# Patient Record
Sex: FEMALE | Race: WHITE | NOT HISPANIC OR LATINO | Employment: FULL TIME | URBAN - METROPOLITAN AREA
[De-identification: names, ages, dates, MRNs, and addresses within clinical notes are randomized per-mention and may not be internally consistent; named-entity substitution may affect disease eponyms.]

---

## 2017-11-21 ENCOUNTER — HOSPITAL ENCOUNTER (OUTPATIENT)
Dept: PREADMISSION TESTING | Facility: HOSPITAL | Age: 52
Discharge: HOME | End: 2017-11-21
Attending: OBSTETRICS & GYNECOLOGY | Admitting: OBSTETRICS & GYNECOLOGY

## 2017-11-21 LAB
ANION GAP SERPL CALC-SCNC: 9.4 MMOL/L (ref 10–20)
BACTERIA SPEC AEROBE CULT: NORMAL
BASOPHILS # BLD AUTO: 0.1 10*3/UL (ref 0–0.2)
BASOPHILS NFR BLD AUTO: 1 % (ref 0–2)
BILIRUB UR QL STRIP: NEGATIVE MG/DL
BUN SERPL-MCNC: 14 MG/DL (ref 8–20)
BUN/CREAT SERPL: 17.5 (ref 5.4–26.2)
CALCIUM SERPL-MCNC: 9.1 MG/DL (ref 8.9–10.3)
CASTS URNS QL MICRO: ABNORMAL /[LPF]
CHLORIDE SERPL-SCNC: 111 MMOL/L (ref 101–111)
COLOR UR: YELLOW
CONV BACTERIA IN URINE MICRO: ABNORMAL
CONV CLARITY OF URINE: ABNORMAL
CONV CO2: 22 MMOL/L (ref 22–32)
CONV HYALINE CASTS IN URINE MICRO: ABNORMAL /[LPF] (ref 0–5)
CONV PROTEIN IN URINE BY AUTOMATED TEST STRIP: NEGATIVE MG/DL
CONV SMALL ROUND CELLS: ABNORMAL /[HPF]
CONV UROBILINOGEN IN URINE BY AUTOMATED TEST STRIP: 0.2 MG/DL
CREAT UR-MCNC: 0.8 MG/DL (ref 0.4–1)
CULTURE INDICATED?: ABNORMAL
DIFFERENTIAL METHOD BLD: (no result)
EOSINOPHIL # BLD AUTO: 0.2 10*3/UL (ref 0–0.3)
EOSINOPHIL # BLD AUTO: 3 % (ref 0–3)
ERYTHROCYTE [DISTWIDTH] IN BLOOD BY AUTOMATED COUNT: 12.5 % (ref 11.5–14.5)
GLUCOSE SERPL-MCNC: 136 MG/DL (ref 65–99)
GLUCOSE UR QL: NEGATIVE MG/DL
HCT VFR BLD AUTO: 37.1 % (ref 35–49)
HGB BLD-MCNC: 12.7 G/DL (ref 12–15)
HGB UR QL STRIP: ABNORMAL
KETONES UR QL STRIP: NEGATIVE MG/DL
LEUKOCYTE ESTERASE UR QL STRIP: NEGATIVE
LYMPHOCYTES # BLD AUTO: 2.2 10*3/UL (ref 0.8–4.8)
LYMPHOCYTES NFR BLD AUTO: 35 % (ref 18–42)
Lab: NORMAL
MCH RBC QN AUTO: 34 PG (ref 26–32)
MCHC RBC AUTO-ENTMCNC: 34.4 G/DL (ref 32–36)
MCV RBC AUTO: 99.1 FL (ref 80–94)
MICRO REPORT STATUS: NORMAL
MONOCYTES # BLD AUTO: 0.5 10*3/UL (ref 0.1–1.3)
MONOCYTES NFR BLD AUTO: 8 % (ref 2–11)
NEUTROPHILS # BLD AUTO: 3.3 10*3/UL (ref 2.3–8.6)
NEUTROPHILS NFR BLD AUTO: 53 % (ref 50–75)
NITRITE UR QL STRIP: NEGATIVE
NRBC BLD AUTO-RTO: 0 /100{WBCS}
NRBC/RBC NFR BLD MANUAL: 0 10*3/UL
PH UR STRIP.AUTO: 6 [PH] (ref 4.5–8)
PLATELET # BLD AUTO: 240 10*3/UL (ref 150–450)
PMV BLD AUTO: 8.5 FL (ref 7.4–10.4)
POTASSIUM SERPL-SCNC: 3.4 MMOL/L (ref 3.6–5.1)
RBC # BLD AUTO: 3.74 10*6/UL (ref 4–5.4)
RBC #/AREA URNS HPF: 1 /[HPF] (ref 0–3)
SODIUM SERPL-SCNC: 139 MMOL/L (ref 136–144)
SP GR UR: 1.02 (ref 1–1.03)
SPECIMEN SOURCE: NORMAL
SPERM URNS QL MICRO: ABNORMAL /[HPF]
SQUAMOUS SPT QL MICRO: 6 /[HPF] (ref 0–5)
UNIDENT CRYS URNS QL MICRO: ABNORMAL /[HPF]
WBC # BLD AUTO: 6.2 10*3/UL (ref 4.5–11.5)
WBC #/AREA URNS HPF: 2 /[HPF] (ref 0–5)
YEAST SPEC QL WET PREP: ABNORMAL /[HPF]

## 2017-11-30 ENCOUNTER — HOSPITAL ENCOUNTER (OUTPATIENT)
Dept: OTHER | Facility: HOSPITAL | Age: 52
Setting detail: SPECIMEN
Discharge: HOME | End: 2017-11-30
Attending: OBSTETRICS & GYNECOLOGY | Admitting: OBSTETRICS & GYNECOLOGY

## 2018-09-21 ENCOUNTER — HOSPITAL ENCOUNTER (OUTPATIENT)
Dept: OTHER | Facility: HOSPITAL | Age: 53
Discharge: HOME | End: 2018-09-21
Admitting: ANESTHESIOLOGY

## 2018-09-21 LAB
ANION GAP SERPL CALC-SCNC: 10.5 MMOL/L (ref 10–20)
BASOPHILS # BLD AUTO: 0.1 10*3/UL (ref 0–0.2)
BASOPHILS NFR BLD AUTO: 1 % (ref 0–2)
BILIRUB UR QL STRIP: NEGATIVE MG/DL
BUN SERPL-MCNC: 10 MG/DL (ref 8–20)
BUN/CREAT SERPL: 14.3 (ref 5.4–26.2)
CALCIUM SERPL-MCNC: 9.1 MG/DL (ref 8.9–10.3)
CASTS URNS QL MICRO: ABNORMAL /[LPF]
CHLORIDE SERPL-SCNC: 107 MMOL/L (ref 101–111)
COLOR UR: YELLOW
CONV BACTERIA IN URINE MICRO: NEGATIVE
CONV CLARITY OF URINE: CLEAR
CONV CO2: 24 MMOL/L (ref 22–32)
CONV HYALINE CASTS IN URINE MICRO: 5 /[LPF] (ref 0–5)
CONV PROTEIN IN URINE BY AUTOMATED TEST STRIP: NEGATIVE MG/DL
CONV SMALL ROUND CELLS: ABNORMAL /[HPF]
CONV UROBILINOGEN IN URINE BY AUTOMATED TEST STRIP: 1 MG/DL
CREAT UR-MCNC: 0.7 MG/DL (ref 0.4–1)
CULTURE INDICATED?: ABNORMAL
DIFFERENTIAL METHOD BLD: (no result)
EOSINOPHIL # BLD AUTO: 0.1 10*3/UL (ref 0–0.3)
EOSINOPHIL # BLD AUTO: 2 % (ref 0–3)
ERYTHROCYTE [DISTWIDTH] IN BLOOD BY AUTOMATED COUNT: 12.7 % (ref 11.5–14.5)
GLUCOSE SERPL-MCNC: 86 MG/DL (ref 65–99)
GLUCOSE UR QL: NEGATIVE MG/DL
HCT VFR BLD AUTO: 38.1 % (ref 35–49)
HGB BLD-MCNC: 12.7 G/DL (ref 12–15)
HGB UR QL STRIP: ABNORMAL
KETONES UR QL STRIP: NEGATIVE MG/DL
LEUKOCYTE ESTERASE UR QL STRIP: NEGATIVE
LYMPHOCYTES # BLD AUTO: 2.2 10*3/UL (ref 0.8–4.8)
LYMPHOCYTES NFR BLD AUTO: 36 % (ref 18–42)
MCH RBC QN AUTO: 32.9 PG (ref 26–32)
MCHC RBC AUTO-ENTMCNC: 33.3 G/DL (ref 32–36)
MCV RBC AUTO: 98.7 FL (ref 80–94)
MONOCYTES # BLD AUTO: 0.4 10*3/UL (ref 0.1–1.3)
MONOCYTES NFR BLD AUTO: 7 % (ref 2–11)
NEUTROPHILS # BLD AUTO: 3.2 10*3/UL (ref 2.3–8.6)
NEUTROPHILS NFR BLD AUTO: 54 % (ref 50–75)
NITRITE UR QL STRIP: NEGATIVE
NRBC BLD AUTO-RTO: 0 /100{WBCS}
NRBC/RBC NFR BLD MANUAL: 0 10*3/UL
PH UR STRIP.AUTO: 6.5 [PH] (ref 4.5–8)
PLATELET # BLD AUTO: 240 10*3/UL (ref 150–450)
PMV BLD AUTO: 8.6 FL (ref 7.4–10.4)
POTASSIUM SERPL-SCNC: 3.5 MMOL/L (ref 3.6–5.1)
RBC # BLD AUTO: 3.86 10*6/UL (ref 4–5.4)
RBC #/AREA URNS HPF: 2 /[HPF] (ref 0–3)
SODIUM SERPL-SCNC: 138 MMOL/L (ref 136–144)
SP GR UR: 1.02 (ref 1–1.03)
SPERM URNS QL MICRO: ABNORMAL /[HPF]
SQUAMOUS SPT QL MICRO: 2 /[HPF] (ref 0–5)
UNIDENT CRYS URNS QL MICRO: ABNORMAL /[HPF]
WBC # BLD AUTO: 6 10*3/UL (ref 4.5–11.5)
WBC #/AREA URNS HPF: 1 /[HPF] (ref 0–5)
YEAST SPEC QL WET PREP: ABNORMAL /[HPF]

## 2018-09-28 ENCOUNTER — HOSPITAL ENCOUNTER (OUTPATIENT)
Dept: OTHER | Facility: HOSPITAL | Age: 53
Setting detail: SPECIMEN
Discharge: HOME | End: 2018-09-28
Attending: OBSTETRICS & GYNECOLOGY | Admitting: OBSTETRICS & GYNECOLOGY

## 2019-01-25 ENCOUNTER — OFFICE (AMBULATORY)
Dept: URBAN - METROPOLITAN AREA PATHOLOGY 4 | Facility: PATHOLOGY | Age: 54
End: 2019-01-25
Payer: COMMERCIAL

## 2019-01-25 ENCOUNTER — ON CAMPUS - OUTPATIENT (AMBULATORY)
Dept: URBAN - METROPOLITAN AREA HOSPITAL 2 | Facility: HOSPITAL | Age: 54
End: 2019-01-25
Payer: COMMERCIAL

## 2019-01-25 VITALS
SYSTOLIC BLOOD PRESSURE: 102 MMHG | HEART RATE: 98 BPM | HEART RATE: 79 BPM | HEART RATE: 97 BPM | SYSTOLIC BLOOD PRESSURE: 99 MMHG | SYSTOLIC BLOOD PRESSURE: 97 MMHG | SYSTOLIC BLOOD PRESSURE: 124 MMHG | HEIGHT: 62 IN | HEART RATE: 81 BPM | DIASTOLIC BLOOD PRESSURE: 61 MMHG | HEART RATE: 74 BPM | DIASTOLIC BLOOD PRESSURE: 67 MMHG | RESPIRATION RATE: 16 BRPM | HEART RATE: 73 BPM | DIASTOLIC BLOOD PRESSURE: 64 MMHG | HEART RATE: 85 BPM | HEART RATE: 75 BPM | SYSTOLIC BLOOD PRESSURE: 95 MMHG | OXYGEN SATURATION: 98 % | HEART RATE: 64 BPM | OXYGEN SATURATION: 95 % | HEART RATE: 90 BPM | DIASTOLIC BLOOD PRESSURE: 65 MMHG | DIASTOLIC BLOOD PRESSURE: 93 MMHG | SYSTOLIC BLOOD PRESSURE: 94 MMHG | HEART RATE: 116 BPM | DIASTOLIC BLOOD PRESSURE: 62 MMHG | HEART RATE: 96 BPM | DIASTOLIC BLOOD PRESSURE: 66 MMHG | SYSTOLIC BLOOD PRESSURE: 98 MMHG | RESPIRATION RATE: 15 BRPM | SYSTOLIC BLOOD PRESSURE: 92 MMHG | SYSTOLIC BLOOD PRESSURE: 83 MMHG | SYSTOLIC BLOOD PRESSURE: 93 MMHG | TEMPERATURE: 97.4 F | DIASTOLIC BLOOD PRESSURE: 78 MMHG | OXYGEN SATURATION: 100 % | DIASTOLIC BLOOD PRESSURE: 74 MMHG | RESPIRATION RATE: 18 BRPM | SYSTOLIC BLOOD PRESSURE: 103 MMHG | WEIGHT: 121 LBS | OXYGEN SATURATION: 96 % | DIASTOLIC BLOOD PRESSURE: 59 MMHG | DIASTOLIC BLOOD PRESSURE: 81 MMHG | RESPIRATION RATE: 14 BRPM | OXYGEN SATURATION: 97 % | OXYGEN SATURATION: 99 % | HEART RATE: 66 BPM | SYSTOLIC BLOOD PRESSURE: 116 MMHG

## 2019-01-25 DIAGNOSIS — D12.4 BENIGN NEOPLASM OF DESCENDING COLON: ICD-10-CM

## 2019-01-25 DIAGNOSIS — D12.2 BENIGN NEOPLASM OF ASCENDING COLON: ICD-10-CM

## 2019-01-25 DIAGNOSIS — D12.5 BENIGN NEOPLASM OF SIGMOID COLON: ICD-10-CM

## 2019-01-25 DIAGNOSIS — K62.1 RECTAL POLYP: ICD-10-CM

## 2019-01-25 DIAGNOSIS — D12.3 BENIGN NEOPLASM OF TRANSVERSE COLON: ICD-10-CM

## 2019-01-25 DIAGNOSIS — D12.0 BENIGN NEOPLASM OF CECUM: ICD-10-CM

## 2019-01-25 DIAGNOSIS — Z12.11 ENCOUNTER FOR SCREENING FOR MALIGNANT NEOPLASM OF COLON: ICD-10-CM

## 2019-01-25 LAB
GI HISTOLOGY: A. UNSPECIFIED: (no result)
GI HISTOLOGY: B. UNSPECIFIED: (no result)
GI HISTOLOGY: C. UNSPECIFIED: (no result)
GI HISTOLOGY: D. UNSPECIFIED: (no result)
GI HISTOLOGY: E. UNSPECIFIED: (no result)
GI HISTOLOGY: F. UNSPECIFIED: (no result)
GI HISTOLOGY: PDF REPORT: (no result)

## 2019-01-25 PROCEDURE — 45380 COLONOSCOPY AND BIOPSY: CPT | Mod: 33,59 | Performed by: INTERNAL MEDICINE

## 2019-01-25 PROCEDURE — 88305 TISSUE EXAM BY PATHOLOGIST: CPT | Mod: 33 | Performed by: INTERNAL MEDICINE

## 2019-01-25 PROCEDURE — 45385 COLONOSCOPY W/LESION REMOVAL: CPT | Mod: 33 | Performed by: INTERNAL MEDICINE

## 2020-01-01 ENCOUNTER — APPOINTMENT (OUTPATIENT)
Dept: GENERAL RADIOLOGY | Facility: HOSPITAL | Age: 55
End: 2020-01-01

## 2020-01-01 ENCOUNTER — HOSPITAL ENCOUNTER (INPATIENT)
Facility: HOSPITAL | Age: 55
LOS: 3 days | End: 2020-11-03
Attending: EMERGENCY MEDICINE | Admitting: INTERNAL MEDICINE

## 2020-01-01 ENCOUNTER — APPOINTMENT (OUTPATIENT)
Dept: CT IMAGING | Facility: HOSPITAL | Age: 55
End: 2020-01-01

## 2020-01-01 VITALS
RESPIRATION RATE: 20 BRPM | SYSTOLIC BLOOD PRESSURE: 88 MMHG | HEIGHT: 62 IN | HEART RATE: 129 BPM | OXYGEN SATURATION: 98 % | DIASTOLIC BLOOD PRESSURE: 56 MMHG | BODY MASS INDEX: 24.46 KG/M2 | WEIGHT: 132.94 LBS | TEMPERATURE: 99.7 F

## 2020-01-01 DIAGNOSIS — J84.10 PULMONARY FIBROSIS (HCC): ICD-10-CM

## 2020-01-01 DIAGNOSIS — J93.9 PNEUMOTHORAX ON RIGHT: Primary | ICD-10-CM

## 2020-01-01 DIAGNOSIS — E87.6 HYPOKALEMIA: ICD-10-CM

## 2020-01-01 LAB
ALBUMIN SERPL-MCNC: 3.4 G/DL (ref 3.5–5.2)
ALBUMIN/GLOB SERPL: 1 G/DL
ALP SERPL-CCNC: 101 U/L (ref 39–117)
ALT SERPL W P-5'-P-CCNC: 29 U/L (ref 1–33)
ANA SER QL: NEGATIVE
ANION GAP SERPL CALCULATED.3IONS-SCNC: 12 MMOL/L (ref 5–15)
ANION GAP SERPL CALCULATED.3IONS-SCNC: 13 MMOL/L (ref 5–15)
ANION GAP SERPL CALCULATED.3IONS-SCNC: 18 MMOL/L (ref 5–15)
APTT PPP: 23.6 SECONDS (ref 24–31)
ARTERIAL PATENCY WRIST A: POSITIVE
AST SERPL-CCNC: 73 U/L (ref 1–32)
ATMOSPHERIC PRESS: ABNORMAL MM[HG]
B PARAPERT DNA SPEC QL NAA+PROBE: NOT DETECTED
B PERT DNA SPEC QL NAA+PROBE: NOT DETECTED
BASE EXCESS BLDA CALC-SCNC: -20.8 MMOL/L (ref 0–3)
BASOPHILS # BLD AUTO: 0 10*3/MM3 (ref 0–0.2)
BASOPHILS # BLD AUTO: 0 10*3/MM3 (ref 0–0.2)
BASOPHILS NFR BLD AUTO: 0.3 % (ref 0–1.5)
BASOPHILS NFR BLD AUTO: 0.3 % (ref 0–1.5)
BDY SITE: ABNORMAL
BILIRUB SERPL-MCNC: 0.2 MG/DL (ref 0–1.2)
BUN SERPL-MCNC: 10 MG/DL (ref 6–20)
BUN SERPL-MCNC: 12 MG/DL (ref 6–20)
BUN SERPL-MCNC: 14 MG/DL (ref 6–20)
BUN/CREAT SERPL: 13.9 (ref 7–25)
BUN/CREAT SERPL: 16.4 (ref 7–25)
BUN/CREAT SERPL: 17.2 (ref 7–25)
C PNEUM DNA NPH QL NAA+NON-PROBE: NOT DETECTED
CA-I BLDA-SCNC: 1.13 MMOL/L (ref 1.15–1.33)
CALCIUM SPEC-SCNC: 8.1 MG/DL (ref 8.6–10.5)
CALCIUM SPEC-SCNC: 8.8 MG/DL (ref 8.6–10.5)
CALCIUM SPEC-SCNC: 8.8 MG/DL (ref 8.6–10.5)
CHLORIDE SERPL-SCNC: 100 MMOL/L (ref 98–107)
CHLORIDE SERPL-SCNC: 103 MMOL/L (ref 98–107)
CHLORIDE SERPL-SCNC: 106 MMOL/L (ref 98–107)
CO2 BLDA-SCNC: 16 MMOL/L (ref 22–29)
CO2 SERPL-SCNC: 21 MMOL/L (ref 22–29)
CO2 SERPL-SCNC: 23 MMOL/L (ref 22–29)
CO2 SERPL-SCNC: 24 MMOL/L (ref 22–29)
CREAT SERPL-MCNC: 0.58 MG/DL (ref 0.57–1)
CREAT SERPL-MCNC: 0.73 MG/DL (ref 0.57–1)
CREAT SERPL-MCNC: 1.01 MG/DL (ref 0.57–1)
D-LACTATE SERPL-SCNC: 13.8 MMOL/L (ref 0.5–2)
DEPRECATED RDW RBC AUTO: 39.4 FL (ref 37–54)
DEPRECATED RDW RBC AUTO: 41.1 FL (ref 37–54)
DEPRECATED RDW RBC AUTO: 42 FL (ref 37–54)
EOSINOPHIL # BLD AUTO: 0 10*3/MM3 (ref 0–0.4)
EOSINOPHIL # BLD AUTO: 0 10*3/MM3 (ref 0–0.4)
EOSINOPHIL # BLD MANUAL: 0.68 10*3/MM3 (ref 0–0.4)
EOSINOPHIL NFR BLD AUTO: 0.2 % (ref 0.3–6.2)
EOSINOPHIL NFR BLD AUTO: 0.4 % (ref 0.3–6.2)
EOSINOPHIL NFR BLD MANUAL: 6 % (ref 0.3–6.2)
ERYTHROCYTE [DISTWIDTH] IN BLOOD BY AUTOMATED COUNT: 11.9 % (ref 12.3–15.4)
ERYTHROCYTE [DISTWIDTH] IN BLOOD BY AUTOMATED COUNT: 12.3 % (ref 12.3–15.4)
ERYTHROCYTE [DISTWIDTH] IN BLOOD BY AUTOMATED COUNT: 12.4 % (ref 12.3–15.4)
FLUAV H1 2009 PAND RNA NPH QL NAA+PROBE: NOT DETECTED
FLUAV H1 HA GENE NPH QL NAA+PROBE: NOT DETECTED
FLUAV H3 RNA NPH QL NAA+PROBE: NOT DETECTED
FLUAV SUBTYP SPEC NAA+PROBE: NOT DETECTED
FLUBV RNA ISLT QL NAA+PROBE: NOT DETECTED
GFR SERPL CREATININE-BSD FRML MDRD: 108 ML/MIN/1.73
GFR SERPL CREATININE-BSD FRML MDRD: 57 ML/MIN/1.73
GFR SERPL CREATININE-BSD FRML MDRD: 82 ML/MIN/1.73
GLOBULIN UR ELPH-MCNC: 3.5 GM/DL
GLUCOSE BLDC GLUCOMTR-MCNC: 143 MG/DL (ref 70–105)
GLUCOSE BLDC GLUCOMTR-MCNC: 50 MG/DL (ref 74–100)
GLUCOSE BLDC GLUCOMTR-MCNC: 50 MG/DL (ref 74–100)
GLUCOSE BLDC GLUCOMTR-MCNC: 53 MG/DL (ref 70–105)
GLUCOSE SERPL-MCNC: 121 MG/DL (ref 65–99)
GLUCOSE SERPL-MCNC: 145 MG/DL (ref 65–99)
GLUCOSE SERPL-MCNC: 291 MG/DL (ref 65–99)
HADV DNA SPEC NAA+PROBE: NOT DETECTED
HBA1C MFR BLD: 5.2 % (ref 3.5–5.6)
HCO3 BLDA-SCNC: 13.7 MMOL/L (ref 21–28)
HCOV 229E RNA SPEC QL NAA+PROBE: NOT DETECTED
HCOV HKU1 RNA SPEC QL NAA+PROBE: NOT DETECTED
HCOV NL63 RNA SPEC QL NAA+PROBE: NOT DETECTED
HCOV OC43 RNA SPEC QL NAA+PROBE: NOT DETECTED
HCT VFR BLD AUTO: 31.3 % (ref 34–46.6)
HCT VFR BLD AUTO: 35.6 % (ref 34–46.6)
HCT VFR BLD AUTO: 39.2 % (ref 34–46.6)
HCT VFR BLDA CALC: 39 % (ref 38–51)
HEMODILUTION: NO
HGB BLD-MCNC: 10.2 G/DL (ref 12–15.9)
HGB BLD-MCNC: 12 G/DL (ref 12–15.9)
HGB BLD-MCNC: 12.8 G/DL (ref 12–15.9)
HGB BLDA-MCNC: 13.3 G/DL (ref 12–17)
HMPV RNA NPH QL NAA+NON-PROBE: NOT DETECTED
HOLD SPECIMEN: NORMAL
HPIV1 RNA SPEC QL NAA+PROBE: NOT DETECTED
HPIV2 RNA SPEC QL NAA+PROBE: NOT DETECTED
HPIV3 RNA NPH QL NAA+PROBE: NOT DETECTED
HPIV4 P GENE NPH QL NAA+PROBE: NOT DETECTED
INHALED O2 CONCENTRATION: 100 %
INR PPP: 1.07 (ref 0.93–1.1)
LACTATE HOLD SPECIMEN: NORMAL
LYMPHOCYTES # BLD AUTO: 1.1 10*3/MM3 (ref 0.7–3.1)
LYMPHOCYTES # BLD AUTO: 1.4 10*3/MM3 (ref 0.7–3.1)
LYMPHOCYTES # BLD MANUAL: 3.31 10*3/MM3 (ref 0.7–3.1)
LYMPHOCYTES NFR BLD AUTO: 10.3 % (ref 19.6–45.3)
LYMPHOCYTES NFR BLD AUTO: 7.5 % (ref 19.6–45.3)
LYMPHOCYTES NFR BLD MANUAL: 1 % (ref 5–12)
LYMPHOCYTES NFR BLD MANUAL: 29 % (ref 19.6–45.3)
M PNEUMO IGG SER IA-ACNC: NOT DETECTED
MAGNESIUM SERPL-MCNC: 1.7 MG/DL (ref 1.6–2.6)
MCH RBC QN AUTO: 30.9 PG (ref 26.6–33)
MCH RBC QN AUTO: 31.3 PG (ref 26.6–33)
MCH RBC QN AUTO: 32 PG (ref 26.6–33)
MCHC RBC AUTO-ENTMCNC: 32.4 G/DL (ref 31.5–35.7)
MCHC RBC AUTO-ENTMCNC: 32.5 G/DL (ref 31.5–35.7)
MCHC RBC AUTO-ENTMCNC: 33.5 G/DL (ref 31.5–35.7)
MCV RBC AUTO: 95.4 FL (ref 79–97)
MCV RBC AUTO: 95.4 FL (ref 79–97)
MCV RBC AUTO: 96.1 FL (ref 79–97)
MODALITY: ABNORMAL
MONOCYTES # BLD AUTO: 0.11 10*3/MM3 (ref 0.1–0.9)
MONOCYTES # BLD AUTO: 0.7 10*3/MM3 (ref 0.1–0.9)
MONOCYTES # BLD AUTO: 0.8 10*3/MM3 (ref 0.1–0.9)
MONOCYTES NFR BLD AUTO: 4.8 % (ref 5–12)
MONOCYTES NFR BLD AUTO: 5.4 % (ref 5–12)
NEUTROPHILS # BLD AUTO: 7.3 10*3/MM3 (ref 1.7–7)
NEUTROPHILS NFR BLD AUTO: 11.6 10*3/MM3 (ref 1.7–7)
NEUTROPHILS NFR BLD AUTO: 12.4 10*3/MM3 (ref 1.7–7)
NEUTROPHILS NFR BLD AUTO: 84.2 % (ref 42.7–76)
NEUTROPHILS NFR BLD AUTO: 86.6 % (ref 42.7–76)
NEUTROPHILS NFR BLD MANUAL: 60 % (ref 42.7–76)
NEUTS BAND NFR BLD MANUAL: 4 % (ref 0–5)
NRBC BLD AUTO-RTO: 0 /100 WBC (ref 0–0.2)
NRBC BLD AUTO-RTO: 0 /100 WBC (ref 0–0.2)
PCO2 BLDA: 76.3 MM HG (ref 35–48)
PEEP RESPIRATORY: 5 CM[H2O]
PH BLDA: 6.86 PH UNITS (ref 7.35–7.45)
PLAT MORPH BLD: NORMAL
PLATELET # BLD AUTO: 223 10*3/MM3 (ref 140–450)
PLATELET # BLD AUTO: 280 10*3/MM3 (ref 140–450)
PLATELET # BLD AUTO: 298 10*3/MM3 (ref 140–450)
PMV BLD AUTO: 7 FL (ref 6–12)
PMV BLD AUTO: 7.4 FL (ref 6–12)
PMV BLD AUTO: 8 FL (ref 6–12)
PO2 BLDA: 107.5 MM HG (ref 83–108)
POTASSIUM BLDA-SCNC: 6.2 MMOL/L (ref 3.5–4.5)
POTASSIUM SERPL-SCNC: 3.1 MMOL/L (ref 3.5–5.2)
POTASSIUM SERPL-SCNC: 3.6 MMOL/L (ref 3.5–5.2)
POTASSIUM SERPL-SCNC: 3.8 MMOL/L (ref 3.5–5.2)
PROT SERPL-MCNC: 6.9 G/DL (ref 6–8.5)
PROTHROMBIN TIME: 11.7 SECONDS (ref 9.6–11.7)
QT INTERVAL: 329 MS
RBC # BLD AUTO: 3.29 10*6/MM3 (ref 3.77–5.28)
RBC # BLD AUTO: 3.73 10*6/MM3 (ref 3.77–5.28)
RBC # BLD AUTO: 4.08 10*6/MM3 (ref 3.77–5.28)
RBC MORPH BLD: NORMAL
RESPIRATORY RATE: 16
RHINOVIRUS RNA SPEC NAA+PROBE: NOT DETECTED
RSV RNA NPH QL NAA+NON-PROBE: NOT DETECTED
SAO2 % BLDCOA: 91.1 % (ref 94–98)
SARS-COV-2 RNA NPH QL NAA+NON-PROBE: NOT DETECTED
SCAN SLIDE: NORMAL
SODIUM BLD-SCNC: 139 MMOL/L (ref 138–146)
SODIUM SERPL-SCNC: 138 MMOL/L (ref 136–145)
SODIUM SERPL-SCNC: 139 MMOL/L (ref 136–145)
SODIUM SERPL-SCNC: 143 MMOL/L (ref 136–145)
TOXIC GRANULATION: ABNORMAL
VENTILATOR MODE: ABNORMAL
VT ON VENT VENT: 400 ML
WBC # BLD AUTO: 11.4 10*3/MM3 (ref 3.4–10.8)
WBC # BLD AUTO: 13.7 10*3/MM3 (ref 3.4–10.8)
WBC # BLD AUTO: 14.3 10*3/MM3 (ref 3.4–10.8)

## 2020-01-01 PROCEDURE — 85025 COMPLETE CBC W/AUTO DIFF WBC: CPT | Performed by: EMERGENCY MEDICINE

## 2020-01-01 PROCEDURE — 94799 UNLISTED PULMONARY SVC/PX: CPT

## 2020-01-01 PROCEDURE — 25010000002 ATROPINE PER 0.01 MG: Performed by: NURSE PRACTITIONER

## 2020-01-01 PROCEDURE — 25010000002 VANCOMYCIN 10 G RECONSTITUTED SOLUTION: Performed by: INTERNAL MEDICINE

## 2020-01-01 PROCEDURE — 25010000002 CEFEPIME PER 500 MG: Performed by: INTERNAL MEDICINE

## 2020-01-01 PROCEDURE — 5A1935Z RESPIRATORY VENTILATION, LESS THAN 24 CONSECUTIVE HOURS: ICD-10-PCS | Performed by: INTERNAL MEDICINE

## 2020-01-01 PROCEDURE — 99284 EMERGENCY DEPT VISIT MOD MDM: CPT

## 2020-01-01 PROCEDURE — 80051 ELECTROLYTE PANEL: CPT

## 2020-01-01 PROCEDURE — 25010000002 HYDROMORPHONE PER 4 MG: Performed by: NURSE PRACTITIONER

## 2020-01-01 PROCEDURE — 80053 COMPREHEN METABOLIC PANEL: CPT | Performed by: EMERGENCY MEDICINE

## 2020-01-01 PROCEDURE — 25010000002 ONDANSETRON PER 1 MG: Performed by: STUDENT IN AN ORGANIZED HEALTH CARE EDUCATION/TRAINING PROGRAM

## 2020-01-01 PROCEDURE — 83520 IMMUNOASSAY QUANT NOS NONAB: CPT | Performed by: INTERNAL MEDICINE

## 2020-01-01 PROCEDURE — 36600 WITHDRAWAL OF ARTERIAL BLOOD: CPT

## 2020-01-01 PROCEDURE — 25010000002 EPINEPHRINE 1 MG/10ML SOLUTION PREFILLED SYRINGE: Performed by: NURSE PRACTITIONER

## 2020-01-01 PROCEDURE — 85007 BL SMEAR W/DIFF WBC COUNT: CPT | Performed by: EMERGENCY MEDICINE

## 2020-01-01 PROCEDURE — 25010000002 VANCOMYCIN 1 G RECONSTITUTED SOLUTION 1 EACH VIAL: Performed by: INTERNAL MEDICINE

## 2020-01-01 PROCEDURE — 85610 PROTHROMBIN TIME: CPT | Performed by: EMERGENCY MEDICINE

## 2020-01-01 PROCEDURE — 82962 GLUCOSE BLOOD TEST: CPT

## 2020-01-01 PROCEDURE — 86256 FLUORESCENT ANTIBODY TITER: CPT | Performed by: INTERNAL MEDICINE

## 2020-01-01 PROCEDURE — 5A12012 PERFORMANCE OF CARDIAC OUTPUT, SINGLE, MANUAL: ICD-10-PCS | Performed by: INTERNAL MEDICINE

## 2020-01-01 PROCEDURE — 82803 BLOOD GASES ANY COMBINATION: CPT

## 2020-01-01 PROCEDURE — 86038 ANTINUCLEAR ANTIBODIES: CPT | Performed by: INTERNAL MEDICINE

## 2020-01-01 PROCEDURE — 83735 ASSAY OF MAGNESIUM: CPT | Performed by: STUDENT IN AN ORGANIZED HEALTH CARE EDUCATION/TRAINING PROGRAM

## 2020-01-01 PROCEDURE — 71045 X-RAY EXAM CHEST 1 VIEW: CPT

## 2020-01-01 PROCEDURE — 99232 SBSQ HOSP IP/OBS MODERATE 35: CPT | Performed by: HOSPITALIST

## 2020-01-01 PROCEDURE — 71046 X-RAY EXAM CHEST 2 VIEWS: CPT

## 2020-01-01 PROCEDURE — 85018 HEMOGLOBIN: CPT

## 2020-01-01 PROCEDURE — 87040 BLOOD CULTURE FOR BACTERIA: CPT | Performed by: INTERNAL MEDICINE

## 2020-01-01 PROCEDURE — 0BH17EZ INSERTION OF ENDOTRACHEAL AIRWAY INTO TRACHEA, VIA NATURAL OR ARTIFICIAL OPENING: ICD-10-PCS | Performed by: INTERNAL MEDICINE

## 2020-01-01 PROCEDURE — 80048 BASIC METABOLIC PNL TOTAL CA: CPT | Performed by: STUDENT IN AN ORGANIZED HEALTH CARE EDUCATION/TRAINING PROGRAM

## 2020-01-01 PROCEDURE — 99222 1ST HOSP IP/OBS MODERATE 55: CPT | Performed by: STUDENT IN AN ORGANIZED HEALTH CARE EDUCATION/TRAINING PROGRAM

## 2020-01-01 PROCEDURE — 74018 RADEX ABDOMEN 1 VIEW: CPT

## 2020-01-01 PROCEDURE — 94002 VENT MGMT INPAT INIT DAY: CPT

## 2020-01-01 PROCEDURE — 85025 COMPLETE CBC W/AUTO DIFF WBC: CPT | Performed by: STUDENT IN AN ORGANIZED HEALTH CARE EDUCATION/TRAINING PROGRAM

## 2020-01-01 PROCEDURE — 99223 1ST HOSP IP/OBS HIGH 75: CPT | Performed by: THORACIC SURGERY (CARDIOTHORACIC VASCULAR SURGERY)

## 2020-01-01 PROCEDURE — 71250 CT THORAX DX C-: CPT

## 2020-01-01 PROCEDURE — 0W9930Z DRAINAGE OF RIGHT PLEURAL CAVITY WITH DRAINAGE DEVICE, PERCUTANEOUS APPROACH: ICD-10-PCS | Performed by: EMERGENCY MEDICINE

## 2020-01-01 PROCEDURE — 92950 HEART/LUNG RESUSCITATION CPR: CPT

## 2020-01-01 PROCEDURE — 25010000003 CEFAZOLIN PER 500 MG: Performed by: EMERGENCY MEDICINE

## 2020-01-01 PROCEDURE — 83036 HEMOGLOBIN GLYCOSYLATED A1C: CPT | Performed by: EMERGENCY MEDICINE

## 2020-01-01 PROCEDURE — 25010000003 CEFAZOLIN PER 500 MG: Performed by: STUDENT IN AN ORGANIZED HEALTH CARE EDUCATION/TRAINING PROGRAM

## 2020-01-01 PROCEDURE — 93005 ELECTROCARDIOGRAM TRACING: CPT | Performed by: EMERGENCY MEDICINE

## 2020-01-01 PROCEDURE — 85730 THROMBOPLASTIN TIME PARTIAL: CPT | Performed by: EMERGENCY MEDICINE

## 2020-01-01 PROCEDURE — 82330 ASSAY OF CALCIUM: CPT

## 2020-01-01 PROCEDURE — 25010000002 HYDROMORPHONE PER 4 MG

## 2020-01-01 PROCEDURE — 83605 ASSAY OF LACTIC ACID: CPT

## 2020-01-01 PROCEDURE — 0202U NFCT DS 22 TRGT SARS-COV-2: CPT | Performed by: INTERNAL MEDICINE

## 2020-01-01 RX ORDER — TAMOXIFEN CITRATE 10 MG/1
20 TABLET ORAL DAILY
Status: DISCONTINUED | OUTPATIENT
Start: 2020-01-01 | End: 2020-01-01 | Stop reason: HOSPADM

## 2020-01-01 RX ORDER — POTASSIUM CHLORIDE 1.5 G/1.77G
40 POWDER, FOR SOLUTION ORAL AS NEEDED
Status: DISCONTINUED | OUTPATIENT
Start: 2020-01-01 | End: 2020-01-01 | Stop reason: HOSPADM

## 2020-01-01 RX ORDER — BUPIVACAINE HCL/0.9 % NACL/PF 0.25 %
.02-.3 PLASTIC BAG, INJECTION (ML) EPIDURAL CONTINUOUS PRN
Status: DISCONTINUED | OUTPATIENT
Start: 2020-01-01 | End: 2020-01-01 | Stop reason: HOSPADM

## 2020-01-01 RX ORDER — OXCARBAZEPINE 300 MG/1
900 TABLET, FILM COATED ORAL 2 TIMES DAILY
COMMUNITY
End: 2020-01-01

## 2020-01-01 RX ORDER — SODIUM CHLORIDE 0.9 % (FLUSH) 0.9 %
10 SYRINGE (ML) INJECTION EVERY 12 HOURS SCHEDULED
Status: DISCONTINUED | OUTPATIENT
Start: 2020-01-01 | End: 2020-01-01 | Stop reason: HOSPADM

## 2020-01-01 RX ORDER — ACETAMINOPHEN 160 MG/5ML
650 SOLUTION ORAL EVERY 4 HOURS PRN
Status: DISCONTINUED | OUTPATIENT
Start: 2020-01-01 | End: 2020-01-01 | Stop reason: SDUPTHER

## 2020-01-01 RX ORDER — HYDROMORPHONE HCL 110MG/55ML
0.5 PATIENT CONTROLLED ANALGESIA SYRINGE INTRAVENOUS
Status: DISCONTINUED | OUTPATIENT
Start: 2020-01-01 | End: 2020-01-01 | Stop reason: HOSPADM

## 2020-01-01 RX ORDER — FENTANYL CITRATE-0.9 % NACL/PF 10 MCG/ML
50-300 PLASTIC BAG, INJECTION (ML) INTRAVENOUS CONTINUOUS PRN
Status: DISCONTINUED | OUTPATIENT
Start: 2020-01-01 | End: 2020-01-01 | Stop reason: HOSPADM

## 2020-01-01 RX ORDER — BISACODYL 10 MG
10 SUPPOSITORY, RECTAL RECTAL DAILY PRN
Status: DISCONTINUED | OUTPATIENT
Start: 2020-01-01 | End: 2020-01-01 | Stop reason: HOSPADM

## 2020-01-01 RX ORDER — DOCUSATE SODIUM 100 MG/1
100 CAPSULE, LIQUID FILLED ORAL 2 TIMES DAILY PRN
Status: DISCONTINUED | OUTPATIENT
Start: 2020-01-01 | End: 2020-01-01 | Stop reason: HOSPADM

## 2020-01-01 RX ORDER — ACETAMINOPHEN 325 MG/1
650 TABLET ORAL EVERY 4 HOURS PRN
Status: DISCONTINUED | OUTPATIENT
Start: 2020-01-01 | End: 2020-01-01 | Stop reason: HOSPADM

## 2020-01-01 RX ORDER — TAMOXIFEN CITRATE 20 MG/1
20 TABLET ORAL DAILY
COMMUNITY

## 2020-01-01 RX ORDER — PANTOPRAZOLE SODIUM 40 MG/10ML
40 INJECTION, POWDER, LYOPHILIZED, FOR SOLUTION INTRAVENOUS
Status: DISCONTINUED | OUTPATIENT
Start: 2020-01-01 | End: 2020-01-01 | Stop reason: HOSPADM

## 2020-01-01 RX ORDER — MAGNESIUM SULFATE 1 G/100ML
1 INJECTION INTRAVENOUS AS NEEDED
Status: DISCONTINUED | OUTPATIENT
Start: 2020-01-01 | End: 2020-01-01 | Stop reason: HOSPADM

## 2020-01-01 RX ORDER — ONDANSETRON 4 MG/1
4 TABLET, FILM COATED ORAL EVERY 6 HOURS PRN
Status: DISCONTINUED | OUTPATIENT
Start: 2020-01-01 | End: 2020-01-01 | Stop reason: HOSPADM

## 2020-01-01 RX ORDER — SODIUM CHLORIDE 0.9 % (FLUSH) 0.9 %
10 SYRINGE (ML) INJECTION AS NEEDED
Status: DISCONTINUED | OUTPATIENT
Start: 2020-01-01 | End: 2020-01-01 | Stop reason: HOSPADM

## 2020-01-01 RX ORDER — NITROGLYCERIN 0.4 MG/1
0.4 TABLET SUBLINGUAL
Status: DISCONTINUED | OUTPATIENT
Start: 2020-01-01 | End: 2020-01-01 | Stop reason: HOSPADM

## 2020-01-01 RX ORDER — EPINEPHRINE 0.1 MG/ML
SYRINGE (ML) INJECTION
Status: COMPLETED | OUTPATIENT
Start: 2020-01-01 | End: 2020-01-01

## 2020-01-01 RX ORDER — SODIUM CHLORIDE, SODIUM LACTATE, POTASSIUM CHLORIDE, CALCIUM CHLORIDE 600; 310; 30; 20 MG/100ML; MG/100ML; MG/100ML; MG/100ML
150 INJECTION, SOLUTION INTRAVENOUS CONTINUOUS
Status: DISPENSED | OUTPATIENT
Start: 2020-01-01 | End: 2020-01-01

## 2020-01-01 RX ORDER — POTASSIUM CHLORIDE 29.8 MG/ML
20 INJECTION INTRAVENOUS
Status: DISCONTINUED | OUTPATIENT
Start: 2020-01-01 | End: 2020-01-01 | Stop reason: HOSPADM

## 2020-01-01 RX ORDER — ACETAMINOPHEN 650 MG/1
650 SUPPOSITORY RECTAL EVERY 4 HOURS PRN
Status: DISCONTINUED | OUTPATIENT
Start: 2020-01-01 | End: 2020-01-01 | Stop reason: SDUPTHER

## 2020-01-01 RX ORDER — TOPIRAMATE 25 MG/1
75 TABLET ORAL EVERY 12 HOURS SCHEDULED
Status: DISCONTINUED | OUTPATIENT
Start: 2020-01-01 | End: 2020-01-01 | Stop reason: HOSPADM

## 2020-01-01 RX ORDER — HYDROMORPHONE HCL 110MG/55ML
1 PATIENT CONTROLLED ANALGESIA SYRINGE INTRAVENOUS ONCE
Status: COMPLETED | OUTPATIENT
Start: 2020-01-01 | End: 2020-01-01

## 2020-01-01 RX ORDER — TOPIRAMATE 50 MG/1
75 TABLET, FILM COATED ORAL 2 TIMES DAILY
COMMUNITY

## 2020-01-01 RX ORDER — MAGNESIUM SULFATE HEPTAHYDRATE 40 MG/ML
2 INJECTION, SOLUTION INTRAVENOUS AS NEEDED
Status: DISCONTINUED | OUTPATIENT
Start: 2020-01-01 | End: 2020-01-01 | Stop reason: HOSPADM

## 2020-01-01 RX ORDER — POTASSIUM CHLORIDE 20 MEQ/1
20 TABLET, EXTENDED RELEASE ORAL ONCE
Status: DISCONTINUED | OUTPATIENT
Start: 2020-01-01 | End: 2020-01-01 | Stop reason: HOSPADM

## 2020-01-01 RX ORDER — POTASSIUM CHLORIDE 7.45 MG/ML
10 INJECTION INTRAVENOUS
Status: DISCONTINUED | OUTPATIENT
Start: 2020-01-01 | End: 2020-01-01 | Stop reason: HOSPADM

## 2020-01-01 RX ORDER — ACETAMINOPHEN 650 MG/1
650 SUPPOSITORY RECTAL EVERY 4 HOURS PRN
Status: DISCONTINUED | OUTPATIENT
Start: 2020-01-01 | End: 2020-01-01 | Stop reason: HOSPADM

## 2020-01-01 RX ORDER — OXCARBAZEPINE 600 MG/1
900 TABLET, FILM COATED ORAL 2 TIMES DAILY
COMMUNITY

## 2020-01-01 RX ORDER — ATROPINE SULFATE 1 MG/ML
INJECTION, SOLUTION INTRAMUSCULAR; INTRAVENOUS; SUBCUTANEOUS
Status: COMPLETED | OUTPATIENT
Start: 2020-01-01 | End: 2020-01-01

## 2020-01-01 RX ORDER — ACETAMINOPHEN 325 MG/1
650 TABLET ORAL EVERY 4 HOURS PRN
Status: DISCONTINUED | OUTPATIENT
Start: 2020-01-01 | End: 2020-01-01 | Stop reason: SDUPTHER

## 2020-01-01 RX ORDER — MORPHINE SULFATE 4 MG/ML
4 INJECTION, SOLUTION INTRAMUSCULAR; INTRAVENOUS
Status: DISCONTINUED | OUTPATIENT
Start: 2020-01-01 | End: 2020-01-01 | Stop reason: HOSPADM

## 2020-01-01 RX ORDER — SODIUM CHLORIDE 9 MG/ML
100 INJECTION, SOLUTION INTRAVENOUS CONTINUOUS
Status: DISCONTINUED | OUTPATIENT
Start: 2020-01-01 | End: 2020-01-01

## 2020-01-01 RX ORDER — LORAZEPAM 2 MG/ML
1 INJECTION INTRAMUSCULAR
Status: DISCONTINUED | OUTPATIENT
Start: 2020-01-01 | End: 2020-01-01 | Stop reason: HOSPADM

## 2020-01-01 RX ORDER — CARBOXYMETHYLCELLULOSE SODIUM 10 MG/ML
1 GEL OPHTHALMIC
Status: DISCONTINUED | OUTPATIENT
Start: 2020-01-01 | End: 2020-01-01 | Stop reason: HOSPADM

## 2020-01-01 RX ORDER — DEXTROSE MONOHYDRATE 25 G/50ML
INJECTION, SOLUTION INTRAVENOUS
Status: COMPLETED
Start: 2020-01-01 | End: 2020-01-01

## 2020-01-01 RX ORDER — VECURONIUM BROMIDE FOR INJECTION 1 MG/ML
0.1 INJECTION, POWDER, LYOPHILIZED, FOR SOLUTION INTRAVENOUS
Status: DISCONTINUED | OUTPATIENT
Start: 2020-01-01 | End: 2020-01-01

## 2020-01-01 RX ORDER — ACETAMINOPHEN 160 MG/5ML
650 SOLUTION ORAL EVERY 4 HOURS PRN
Status: DISCONTINUED | OUTPATIENT
Start: 2020-01-01 | End: 2020-01-01 | Stop reason: HOSPADM

## 2020-01-01 RX ORDER — POTASSIUM CHLORIDE 20 MEQ/1
40 TABLET, EXTENDED RELEASE ORAL AS NEEDED
Status: DISCONTINUED | OUTPATIENT
Start: 2020-01-01 | End: 2020-01-01 | Stop reason: HOSPADM

## 2020-01-01 RX ORDER — HYDROMORPHONE HCL 110MG/55ML
PATIENT CONTROLLED ANALGESIA SYRINGE INTRAVENOUS
Status: COMPLETED
Start: 2020-01-01 | End: 2020-01-01

## 2020-01-01 RX ORDER — CHOLECALCIFEROL (VITAMIN D3) 125 MCG
5 CAPSULE ORAL NIGHTLY PRN
Status: DISCONTINUED | OUTPATIENT
Start: 2020-01-01 | End: 2020-01-01 | Stop reason: HOSPADM

## 2020-01-01 RX ORDER — ONDANSETRON 2 MG/ML
4 INJECTION INTRAMUSCULAR; INTRAVENOUS EVERY 6 HOURS PRN
Status: DISCONTINUED | OUTPATIENT
Start: 2020-01-01 | End: 2020-01-01 | Stop reason: HOSPADM

## 2020-01-01 RX ADMIN — HYDROMORPHONE HYDROCHLORIDE 0.5 MG: 2 INJECTION, SOLUTION INTRAMUSCULAR; INTRAVENOUS; SUBCUTANEOUS at 20:35

## 2020-01-01 RX ADMIN — CEFAZOLIN 1 G: 1 INJECTION, POWDER, FOR SOLUTION INTRAMUSCULAR; INTRAVENOUS at 23:04

## 2020-01-01 RX ADMIN — HYDROMORPHONE HYDROCHLORIDE 0.5 MG: 2 INJECTION, SOLUTION INTRAMUSCULAR; INTRAVENOUS; SUBCUTANEOUS at 01:44

## 2020-01-01 RX ADMIN — CEFEPIME HYDROCHLORIDE 2 G: 2 INJECTION, POWDER, FOR SOLUTION INTRAVENOUS at 20:34

## 2020-01-01 RX ADMIN — Medication 1 MG: at 07:17

## 2020-01-01 RX ADMIN — TAMOXIFEN CITRATE 20 MG: 10 TABLET, FILM COATED ORAL at 08:34

## 2020-01-01 RX ADMIN — CEFEPIME HYDROCHLORIDE 2 G: 2 INJECTION, POWDER, FOR SOLUTION INTRAVENOUS at 03:10

## 2020-01-01 RX ADMIN — ONDANSETRON 4 MG: 2 INJECTION INTRAMUSCULAR; INTRAVENOUS at 14:10

## 2020-01-01 RX ADMIN — OXCARBAZEPINE 900 MG: 600 TABLET, FILM COATED ORAL at 20:57

## 2020-01-01 RX ADMIN — ACETAMINOPHEN 650 MG: 325 TABLET, FILM COATED ORAL at 20:56

## 2020-01-01 RX ADMIN — Medication 1 MG: at 06:20

## 2020-01-01 RX ADMIN — Medication 10 ML: at 09:17

## 2020-01-01 RX ADMIN — HYDROMORPHONE HYDROCHLORIDE 0.5 MG: 2 INJECTION, SOLUTION INTRAMUSCULAR; INTRAVENOUS; SUBCUTANEOUS at 12:39

## 2020-01-01 RX ADMIN — HYDROMORPHONE HYDROCHLORIDE 1 MG: 2 INJECTION, SOLUTION INTRAMUSCULAR; INTRAVENOUS; SUBCUTANEOUS at 06:20

## 2020-01-01 RX ADMIN — Medication 10 ML: at 20:52

## 2020-01-01 RX ADMIN — TOPIRAMATE 75 MG: 25 TABLET, FILM COATED ORAL at 08:33

## 2020-01-01 RX ADMIN — TOPIRAMATE 75 MG: 25 TABLET, FILM COATED ORAL at 20:20

## 2020-01-01 RX ADMIN — CEFEPIME HYDROCHLORIDE 2 G: 2 INJECTION, POWDER, FOR SOLUTION INTRAVENOUS at 12:30

## 2020-01-01 RX ADMIN — HYDROMORPHONE HYDROCHLORIDE 0.5 MG: 2 INJECTION, SOLUTION INTRAMUSCULAR; INTRAVENOUS; SUBCUTANEOUS at 08:35

## 2020-01-01 RX ADMIN — SODIUM CHLORIDE 1000 MG: 900 INJECTION, SOLUTION INTRAVENOUS at 00:34

## 2020-01-01 RX ADMIN — HYDROMORPHONE HYDROCHLORIDE 0.5 MG: 2 INJECTION, SOLUTION INTRAMUSCULAR; INTRAVENOUS; SUBCUTANEOUS at 03:10

## 2020-01-01 RX ADMIN — ONDANSETRON 4 MG: 2 INJECTION INTRAMUSCULAR; INTRAVENOUS at 12:50

## 2020-01-01 RX ADMIN — OXCARBAZEPINE 900 MG: 600 TABLET, FILM COATED ORAL at 09:13

## 2020-01-01 RX ADMIN — SODIUM CHLORIDE, POTASSIUM CHLORIDE, SODIUM LACTATE AND CALCIUM CHLORIDE 150 ML/HR: 600; 310; 30; 20 INJECTION, SOLUTION INTRAVENOUS at 21:46

## 2020-01-01 RX ADMIN — ATROPINE SULFATE 1 MG: 1 INJECTION, SOLUTION INTRAMUSCULAR; INTRAVENOUS; SUBCUTANEOUS at 07:14

## 2020-01-01 RX ADMIN — CEFAZOLIN 1 G: 1 INJECTION, POWDER, FOR SOLUTION INTRAMUSCULAR; INTRAVENOUS at 22:06

## 2020-01-01 RX ADMIN — HYDROMORPHONE HYDROCHLORIDE 0.5 MG: 2 INJECTION, SOLUTION INTRAMUSCULAR; INTRAVENOUS; SUBCUTANEOUS at 20:41

## 2020-01-01 RX ADMIN — SODIUM CHLORIDE 1250 MG: 9 INJECTION, SOLUTION INTRAVENOUS at 13:07

## 2020-01-01 RX ADMIN — CEFAZOLIN 1 G: 1 INJECTION, POWDER, FOR SOLUTION INTRAMUSCULAR; INTRAVENOUS at 08:34

## 2020-01-01 RX ADMIN — OXCARBAZEPINE 900 MG: 600 TABLET, FILM COATED ORAL at 20:23

## 2020-01-01 RX ADMIN — HYDROMORPHONE HYDROCHLORIDE 0.5 MG: 2 INJECTION, SOLUTION INTRAMUSCULAR; INTRAVENOUS; SUBCUTANEOUS at 16:22

## 2020-01-01 RX ADMIN — HYDROMORPHONE HYDROCHLORIDE 0.5 MG: 2 INJECTION, SOLUTION INTRAMUSCULAR; INTRAVENOUS; SUBCUTANEOUS at 09:38

## 2020-01-01 RX ADMIN — SODIUM CHLORIDE, POTASSIUM CHLORIDE, SODIUM LACTATE AND CALCIUM CHLORIDE 150 ML/HR: 600; 310; 30; 20 INJECTION, SOLUTION INTRAVENOUS at 07:37

## 2020-01-01 RX ADMIN — HYDROMORPHONE HYDROCHLORIDE 0.5 MG: 2 INJECTION, SOLUTION INTRAMUSCULAR; INTRAVENOUS; SUBCUTANEOUS at 16:01

## 2020-01-01 RX ADMIN — SODIUM CHLORIDE 250 ML: 9 INJECTION, SOLUTION INTRAVENOUS at 23:10

## 2020-01-01 RX ADMIN — DEXTROSE MONOHYDRATE 50 ML: 25 INJECTION, SOLUTION INTRAVENOUS at 07:58

## 2020-01-01 RX ADMIN — TAMOXIFEN CITRATE 20 MG: 10 TABLET, FILM COATED ORAL at 09:13

## 2020-01-01 RX ADMIN — SODIUM CHLORIDE 500 ML: 9 INJECTION, SOLUTION INTRAVENOUS at 01:06

## 2020-01-01 RX ADMIN — ONDANSETRON 4 MG: 2 INJECTION INTRAMUSCULAR; INTRAVENOUS at 01:57

## 2020-01-01 RX ADMIN — ONDANSETRON 4 MG: 2 INJECTION INTRAMUSCULAR; INTRAVENOUS at 19:02

## 2020-01-01 RX ADMIN — TOPIRAMATE 75 MG: 25 TABLET, FILM COATED ORAL at 20:34

## 2020-01-01 RX ADMIN — CEFAZOLIN 1 G: 1 INJECTION, POWDER, FOR SOLUTION INTRAMUSCULAR; INTRAVENOUS at 15:40

## 2020-01-01 RX ADMIN — SODIUM CHLORIDE, POTASSIUM CHLORIDE, SODIUM LACTATE AND CALCIUM CHLORIDE 150 ML/HR: 600; 310; 30; 20 INJECTION, SOLUTION INTRAVENOUS at 15:09

## 2020-01-01 RX ADMIN — METOPROLOL TARTRATE 2.5 MG: 5 INJECTION INTRAVENOUS at 01:37

## 2020-01-01 RX ADMIN — Medication 1 MG: at 07:14

## 2020-01-01 RX ADMIN — HYDROMORPHONE HYDROCHLORIDE 0.5 MG: 2 INJECTION, SOLUTION INTRAMUSCULAR; INTRAVENOUS; SUBCUTANEOUS at 11:17

## 2020-01-01 RX ADMIN — HYDROMORPHONE HYDROCHLORIDE 0.5 MG: 2 INJECTION, SOLUTION INTRAMUSCULAR; INTRAVENOUS; SUBCUTANEOUS at 06:25

## 2020-01-01 RX ADMIN — HYDROMORPHONE HYDROCHLORIDE 0.5 MG: 2 INJECTION, SOLUTION INTRAMUSCULAR; INTRAVENOUS; SUBCUTANEOUS at 14:10

## 2020-01-01 RX ADMIN — Medication 10 ML: at 08:35

## 2020-01-01 RX ADMIN — HYDROMORPHONE HYDROCHLORIDE 0.5 MG: 2 INJECTION, SOLUTION INTRAMUSCULAR; INTRAVENOUS; SUBCUTANEOUS at 00:34

## 2020-01-01 RX ADMIN — SODIUM CHLORIDE, POTASSIUM CHLORIDE, SODIUM LACTATE AND CALCIUM CHLORIDE 150 ML/HR: 600; 310; 30; 20 INJECTION, SOLUTION INTRAVENOUS at 00:37

## 2020-01-01 RX ADMIN — CEFAZOLIN 1 G: 1 INJECTION, POWDER, FOR SOLUTION INTRAMUSCULAR; INTRAVENOUS at 09:13

## 2020-01-01 RX ADMIN — OXCARBAZEPINE 900 MG: 600 TABLET, FILM COATED ORAL at 12:02

## 2020-01-01 RX ADMIN — TOPIRAMATE 75 MG: 25 TABLET, FILM COATED ORAL at 09:23

## 2020-09-14 ENCOUNTER — ON CAMPUS - OUTPATIENT (AMBULATORY)
Dept: URBAN - METROPOLITAN AREA HOSPITAL 2 | Facility: HOSPITAL | Age: 55
End: 2020-09-14

## 2020-09-14 ENCOUNTER — OFFICE (AMBULATORY)
Dept: URBAN - METROPOLITAN AREA PATHOLOGY 4 | Facility: PATHOLOGY | Age: 55
End: 2020-09-14

## 2020-09-14 VITALS
DIASTOLIC BLOOD PRESSURE: 53 MMHG | DIASTOLIC BLOOD PRESSURE: 111 MMHG | SYSTOLIC BLOOD PRESSURE: 123 MMHG | DIASTOLIC BLOOD PRESSURE: 71 MMHG | DIASTOLIC BLOOD PRESSURE: 63 MMHG | SYSTOLIC BLOOD PRESSURE: 109 MMHG | OXYGEN SATURATION: 100 % | SYSTOLIC BLOOD PRESSURE: 153 MMHG | HEART RATE: 95 BPM | HEART RATE: 80 BPM | DIASTOLIC BLOOD PRESSURE: 74 MMHG | RESPIRATION RATE: 16 BRPM | HEIGHT: 62 IN | DIASTOLIC BLOOD PRESSURE: 70 MMHG | HEART RATE: 94 BPM | HEART RATE: 82 BPM | HEART RATE: 98 BPM | WEIGHT: 126 LBS | HEART RATE: 92 BPM | DIASTOLIC BLOOD PRESSURE: 60 MMHG | OXYGEN SATURATION: 99 % | TEMPERATURE: 97.7 F | HEART RATE: 88 BPM | SYSTOLIC BLOOD PRESSURE: 102 MMHG | SYSTOLIC BLOOD PRESSURE: 118 MMHG | RESPIRATION RATE: 18 BRPM | SYSTOLIC BLOOD PRESSURE: 113 MMHG | DIASTOLIC BLOOD PRESSURE: 82 MMHG | DIASTOLIC BLOOD PRESSURE: 61 MMHG | HEART RATE: 79 BPM | SYSTOLIC BLOOD PRESSURE: 137 MMHG | DIASTOLIC BLOOD PRESSURE: 59 MMHG | HEART RATE: 75 BPM | DIASTOLIC BLOOD PRESSURE: 66 MMHG | SYSTOLIC BLOOD PRESSURE: 96 MMHG | SYSTOLIC BLOOD PRESSURE: 116 MMHG | HEART RATE: 85 BPM | OXYGEN SATURATION: 95 % | SYSTOLIC BLOOD PRESSURE: 114 MMHG | OXYGEN SATURATION: 98 %

## 2020-09-14 DIAGNOSIS — K63.5 POLYP OF COLON: ICD-10-CM

## 2020-09-14 DIAGNOSIS — Z86.010 PERSONAL HISTORY OF COLONIC POLYPS: ICD-10-CM

## 2020-09-14 DIAGNOSIS — D12.0 BENIGN NEOPLASM OF CECUM: ICD-10-CM

## 2020-09-14 DIAGNOSIS — D12.3 BENIGN NEOPLASM OF TRANSVERSE COLON: ICD-10-CM

## 2020-09-14 DIAGNOSIS — D12.2 BENIGN NEOPLASM OF ASCENDING COLON: ICD-10-CM

## 2020-09-14 LAB
GI HISTOLOGY: A. UNSPECIFIED: (no result)
GI HISTOLOGY: B. UNSPECIFIED: (no result)
GI HISTOLOGY: C. UNSPECIFIED: (no result)
GI HISTOLOGY: D. UNSPECIFIED: (no result)
GI HISTOLOGY: E. UNSPECIFIED: (no result)
GI HISTOLOGY: PDF REPORT: (no result)

## 2020-09-14 PROCEDURE — 45385 COLONOSCOPY W/LESION REMOVAL: CPT | Mod: 33 | Performed by: INTERNAL MEDICINE

## 2020-09-14 PROCEDURE — 88305 TISSUE EXAM BY PATHOLOGIST: CPT | Mod: 33 | Performed by: INTERNAL MEDICINE

## 2020-10-31 PROBLEM — J93.9 PNEUMOTHORAX ON RIGHT: Status: ACTIVE | Noted: 2020-01-01

## 2020-11-01 PROBLEM — R73.09 ELEVATED GLUCOSE: Status: ACTIVE | Noted: 2020-01-01

## 2020-11-01 PROBLEM — J84.10 PULMONARY FIBROSIS (HCC): Status: ACTIVE | Noted: 2020-01-01

## 2020-11-01 PROBLEM — E87.6 HYPOKALEMIA: Status: ACTIVE | Noted: 2020-01-01

## 2020-11-01 PROBLEM — J84.10 PULMONARY FIBROSIS (HCC): Chronic | Status: ACTIVE | Noted: 2020-01-01

## 2020-11-01 PROBLEM — J95.811 POSTPROCEDURAL PNEUMOTHORAX: Status: ACTIVE | Noted: 2020-01-01

## 2020-11-01 NOTE — CONSULTS
PULMONARY/ CRITICAL CARE/ SLEEP MEDICINE CONSULT NOTE        Patient Name:  Shannan Laboy    :  1965    Medical Record:  [id number]    REQUESTING PHYSICIAN    Provider, No Known    PRIMARY CARE PHYSICIAN     Provider, No Known    REASON FOR CONSULTATION    Shannan Laboy is a 55 y.o. female who was referred for consultation for pneumothorax and hypoxemic respiratory failure    HPI  The patient presented to the emergency department last night for evaluation sudden onset of shortness of air and right sided chest pain which was sharp.  On arrival to the emergency department the patient was very short of air and had conversational dyspnea.  There is no report of fever, chills, nausea, vomiting, midsternal chest pain, cough, or expectoration.  Chest x-ray performed in the emergency department revealed a moderate right pneumothorax.  An emergent chest tube was placed in the emergency department and the patient had sudden relief of her shortness of air.  She was admitted for further evaluation and treatment    The patient has had a complicated recent medical history.  On 10/22/2020 she underwent a bronchoscopy with biopsy for confirmation of bilateral pulmonary fibrosis by Dr. Craig at [facility].  She had a post procedure right pneumothorax and had a chest tube placed.  She was then admitted for further treatment.  The chest tube did not resolve the pneumothorax and the second chest tube was placed on 10/25.  The patient had resolution of the pneumothorax and the chest tube discontinued.  The patient was able to be discharged home on 10/29/2020.    REVIEW OF SYSTEMS    as above    MEDICAL HISTORY    Past Medical History:   Diagnosis Date   • Bifrontal or frontal glioma 2002   • Breast cancer of upper-outer quadrant of left female breast (CMS/HCC) 2016   • Cancer (CMS/HCC)     left breast   • Mixed anxiety and depressive disorder 2016   • Postprocedural pneumothorax 10/22/2020   • Pulmonary  fibrosis (CMS/[facility]) 10/14/2020    Added automatically from request for surgery [id number]   • Seizures (CMS/[facility]) 2014        SURGICAL HISTORY    History reviewed. No pertinent surgical history.     FAMILY HISTORY    Family History   Problem Relation Age of Onset   • No Known Problems Mother    • No Known Problems Father        SOCIAL HISTORY    Social History     Tobacco Use   • Smoking status: Never Smoker   • Smokeless tobacco: Never Used   Substance Use Topics   • Alcohol use: Never     Frequency: Never        ALLERGIES    Allergies   Allergen Reactions   • Aspirin Anaphylaxis     UPSETS STOMACH       MEDICATIONS    Scheduled Meds:ceFAZolin, 1 g, Intravenous, Q8H  OXcarbazepine, 900 mg, Oral, Q12H  potassium chloride, 20 mEq, Oral, Once  sodium chloride, 10 mL, Intravenous, Q12H  tamoxifen, 20 mg, Oral, Daily  topiramate, 75 mg, Oral, Q12H      Continuous Infusions:lactated ringers, 150 mL/hr, Last Rate: 150 mL/hr (20 0737)      PRN Meds:.•  acetaminophen **OR** acetaminophen **OR** acetaminophen  •  bisacodyl  •  docusate sodium  •  HYDROmorphone  •  magnesium sulfate **OR** magnesium sulfate in D5W 1g/100mL (PREMIX)  •  melatonin  •  nitroglycerin  •  ondansetron **OR** ondansetron  •  potassium chloride **OR** potassium chloride **OR** potassium chloride  •  [COMPLETED] Insert peripheral IV **AND** sodium chloride  •  sodium chloride      PHYSICAL EXAM    tMax 24 hrs:  Temp (24hrs), Av.5 °F (36.9 °C), Min:98.3 °F (36.8 °C), Max:98.7 °F (37.1 °C)    Vitals Ranges:  Temp:  [98.3 °F (36.8 °C)-98.7 °F (37.1 °C)] 98.3 °F (36.8 °C)  Heart Rate:  [] 126  Resp:  [27-40] 40  BP: ()/(60-86) 108/86  Intake and Output Last 3 Shifts:  I/O last 3 completed shifts:  In: 680 [I.V.:680]  Out: 350 [Urine:350]    Constitutional:  no acute distress, ill-appearing  Eyes:  PERRL, conjunctiva normal   HENT:  Atraumatic, external ears normal, nose normal  Neck- normal range of motion, no tenderness, supple    Respiratory:  No respiratory distress, normal breath sounds, bibasilar rales, no wheezing.  Right chest tube secured  Cardiovascular: Sinus tachycardia, no murmurs, no gallops, no rubs   GI:  Soft, nondistended, normal bowel sounds, nontender, no rebound, no guarding   : Deferred  Musculoskeletal:  No edema, no tenderness, no deformities  Integument:  Well hydrated, no rash, chest tube dressing  Neurologic:  Alert & oriented x 3, CN 2-12 normal, normal motor function, normal sensory function, no focal deficits noted   Psychiatric:  Speech and behavior appropriate     LABS    Lab Results (last 24 hours)     Procedure Component Value Units Date/Time    Basic Metabolic Panel [[id number]]  (Abnormal) Collected: 11/01/20 0313    Specimen: Blood Updated: 11/01/20 0421     Glucose 121 mg/dL      BUN 12 mg/dL      Creatinine 0.73 mg/dL      Sodium 143 mmol/L      Potassium 3.8 mmol/L      Chloride 106 mmol/L      CO2 24.0 mmol/L      Calcium 8.8 mg/dL      eGFR Non African Amer 82 mL/min/1.73      BUN/Creatinine Ratio 16.4     Anion Gap 13.0 mmol/L     Narrative:      GFR Normal >60  Chronic Kidney Disease <60  Kidney Failure <15      CBC Auto Differential [[id number]]  (Abnormal) Collected: 11/01/20 0313    Specimen: Blood Updated: 11/01/20 0403     WBC 13.70 10*3/mm3      RBC 4.08 10*6/mm3      Hemoglobin 12.8 g/dL      Hematocrit 39.2 %      MCV 96.1 fL      MCH 31.3 pg      MCHC 32.5 g/dL      RDW 12.4 %      RDW-SD 42.0 fl      MPV 7.4 fL      Platelets 280 10*3/mm3      Neutrophil % 84.2 %      Lymphocyte % 10.3 %      Monocyte % 4.8 %      Eosinophil % 0.4 %      Basophil % 0.3 %      Neutrophils, Absolute 11.60 10*3/mm3      Lymphocytes, Absolute 1.40 10*3/mm3      Monocytes, Absolute 0.70 10*3/mm3      Eosinophils, Absolute 0.00 10*3/mm3      Basophils, Absolute 0.00 10*3/mm3      nRBC 0.0 /100 WBC     Hemoglobin A1c [[id number]] Collected: 11/01/20 0313    Specimen: Blood Updated: 11/01/20 0352    Extra Tubes  [[id number]] Collected: 10/31/20 2215    Specimen: Blood, Venous Line Updated: 10/31/20 2315    Narrative:      The following orders were created for panel order Extra Tubes.  Procedure                               Abnormality         Status                     ---------                               -----------         ------                     Gold Top - SST[[id number]]                                   Final result                 Please view results for these tests on the individual orders.    Gold Top - SST [[id number]] Collected: 10/31/20 2215    Specimen: Blood Updated: 10/31/20 2315     Extra Tube Hold for add-ons.     Comment: Auto resulted.       CBC & Differential [[id number]]  (Abnormal) Collected: 10/31/20 2215    Specimen: Blood Updated: 10/31/20 2308    Narrative:      The following orders were created for panel order CBC & Differential.  Procedure                               Abnormality         Status                     ---------                               -----------         ------                     CBC Auto Differential[[id number]]        Abnormal            Final result                 Please view results for these tests on the individual orders.    CBC Auto Differential [[id number]]  (Abnormal) Collected: 10/31/20 2215    Specimen: Blood Updated: 10/31/20 2308     WBC 11.40 10*3/mm3      RBC 3.73 10*6/mm3      Hemoglobin 12.0 g/dL      Hematocrit 35.6 %      MCV 95.4 fL      MCH 32.0 pg      MCHC 33.5 g/dL      RDW 11.9 %      RDW-SD 39.4 fl      MPV 7.0 fL      Platelets 298 10*3/mm3     Scan Slide [[id number]] Collected: 10/31/20 2215    Specimen: Blood Updated: 10/31/20 2308     Scan Slide --     Comment: See Manual Differential Results       Manual Differential [[id number]]  (Abnormal) Collected: 10/31/20 2215    Specimen: Blood Updated: 10/31/20 2308     Neutrophil % 60.0 %      Lymphocyte % 29.0 %      Monocyte % 1.0 %      Eosinophil % 6.0 %      Bands %  4.0 %      Neutrophils Absolute  7.30 10*3/mm3      Lymphocytes Absolute 3.31 10*3/mm3      Monocytes Absolute 0.11 10*3/mm3      Eosinophils Absolute 0.68 10*3/mm3      RBC Morphology Normal     Toxic Granulation Slight/1+     Platelet Morphology Normal    Comprehensive Metabolic Panel [[id number]]  (Abnormal) Collected: 10/31/20 2215    Specimen: Blood Updated: 10/31/20 2248     Glucose 291 mg/dL      BUN 14 mg/dL      Creatinine 1.01 mg/dL      Sodium 139 mmol/L      Potassium 3.1 mmol/L      Comment: Slight hemolysis detected by analyzer. Results may be affected.        Chloride 100 mmol/L      CO2 21.0 mmol/L      Calcium 8.8 mg/dL      Total Protein 6.9 g/dL      Albumin 3.40 g/dL      ALT (SGPT) 29 U/L      AST (SGOT) 73 U/L      Comment: Slight hemolysis detected by analyzer. Results may be affected.        Alkaline Phosphatase 101 U/L      Total Bilirubin 0.2 mg/dL      eGFR Non African Amer 57 mL/min/1.73      Globulin 3.5 gm/dL      A/G Ratio 1.0 g/dL      BUN/Creatinine Ratio 13.9     Anion Gap 18.0 mmol/L     Narrative:      GFR Normal >60  Chronic Kidney Disease <60  Kidney Failure <15      Protime-INR [[id number]]  (Normal) Collected: 10/31/20 2215    Specimen: Blood Updated: 10/31/20 2231     Protime 11.7 Seconds      INR 1.07    aPTT [[id number]]  (Abnormal) Collected: 10/31/20 2215    Specimen: Blood Updated: 10/31/20 2231     PTT 23.6 seconds          Microbiology Results (last 10 days)     ** No results found for the last 240 hours. **         CBC  Results from last 7 days   Lab Units 11/01/20  0313 10/31/20  2215 10/27/20  0848   WBC 10*3/mm3 13.70* 11.40* 8.87   RBC 10*6/mm3 4.08 3.73* 3.76*   HEMOGLOBIN g/dL 12.8 12.0 11.6*   HEMATOCRIT % 39.2 35.6 35.5*   MCV fL 96.1 95.4 94.4   PLATELETS 10*3/mm3 280 298 289       BMP  Results from last 7 days   Lab Units 11/01/20  0313 10/31/20  2215   SODIUM mmol/L 143 139   POTASSIUM mmol/L 3.8 3.1*   CHLORIDE mmol/L 106 100   CO2 mmol/L 24.0 21.0*   BUN mg/dL 12 14   CREATININE mg/dL 0.73  1.01*   GLUCOSE mg/dL 121* 291*       CMP   Results from last 7 days   Lab Units 11/01/20  0313 10/31/20  2215   SODIUM mmol/L 143 139   POTASSIUM mmol/L 3.8 3.1*   CHLORIDE mmol/L 106 100   CO2 mmol/L 24.0 21.0*   BUN mg/dL 12 14   CREATININE mg/dL 0.73 1.01*   GLUCOSE mg/dL 121* 291*   ALBUMIN g/dL  --  3.40*   BILIRUBIN mg/dL  --  0.2   ALK PHOS U/L  --  101   AST (SGOT) U/L  --  73*   ALT (SGPT) U/L  --  29       TROPONIN  Results from last 7 days   Lab Units 10/26/20  1752   TROPONIN I ng/mL <0.012       CoAg  Results from last 7 days   Lab Units 10/31/20  2215   INR  1.07   APTT seconds 23.6*       Creatinine Clearance  Estimated Creatinine Clearance: 73.9 mL/min (by C-G formula based on SCr of 0.73 mg/dL).    ABG      IMAGING & OTHER STUDIES    Imaging Results (Last 72 Hours)     Procedure Component Value Units Date/Time    XR Chest 1 View [[id number]] Collected: 11/01/20 0743     Updated: 11/01/20 0747    Narrative:         DATE OF EXAM:   11/1/2020 6:24 AM     PROCEDURE:   XR CHEST 1 VW-     INDICATIONS:   soa; J93.9-Pneumothorax, unspecified; E87.6-Hypokalemia;  J84.10-Pulmonary fibrosis, unspecified     COMPARISON:  10/31/2020     TECHNIQUE:   Portable chest radiograph.     FINDINGS:    There is a smallbore right chest tube in place. There is a suspected  small residual right apical pneumothorax. Heart size enlarged. No  pneumothorax on the left. There are small bilateral pleural effusions.  There are suspected areas of chronic scarring at the left and right lung  apex. Stable fullness in left paratracheal soft tissues.       Impression:      1. Right smallbore chest tube in place with suspected small residual  right apical pneumothorax.  2. Abnormal appearance of the left and right lung apex which may relate  to chronic scarring and/or consolidation.   3. Left paratracheal soft tissue fullness again noted and could be  further assessed with cross-sectional imaging.  3. Small bilateral pleural effusions.      Electronically Signed By-Kelton Cooney On:11/1/2020 7:45 AM  This report was finalized on [id number] by  Kelton Cooney .    XR Chest 1 View [[id number]] Collected: 11/01/20 0712     Updated: 11/01/20 0715    Narrative:         DATE OF EXAM:   10/31/2020 9:57 PM     PROCEDURE:   XR CHEST 1 VW-     INDICATIONS:   Shortness of air     COMPARISON:  No Comparisons Available     TECHNIQUE:   Portable chest radiograph.     FINDINGS:    There is a moderate right apical pneumothorax measuring 3.8 cm and the  right apex. The heart size is normal. No definite pneumothorax on the  left. There is a suspected chronic biapical scarring/consolidation  noted. Left paratracheal soft tissue fullness. Small bilateral pleural  effusions. Surgical clips overlie the left axilla.       Impression:      1. Moderate right apical pneumothorax measuring 3.8 cm.  2. Bilateral upper lobe consolidation and/or chronic scarring.  3. Fullness in the left paratracheal soft tissues, chest CT may be  helpful to further characterize.  4. Small bilateral pleural effusions.     Electronically Signed By-Kelton Cooney On:11/1/2020 7:13 AM  This report was finalized on [id number] by  Kelton Cooney .    XR Chest 1 View [[id number]] Collected: 11/01/20 0706     Updated: 11/01/20 0711    Narrative:         DATE OF EXAM:   10/31/2020 10:26 PM     PROCEDURE:   XR CHEST 1 VW-     INDICATIONS:   Post chest tube insertion     COMPARISON:  10/31/2020     TECHNIQUE:   Portable chest radiograph.     FINDINGS:    Interval placement of a smallbore chest tube overlying the right apex  with improved aeration of the right lung. There is persistent pleural  fluid and air at the right apex. Residual right apical pneumothorax  measures 18 mm from the right apex, previously 37 mm. There is biapical  consolidation/scarring. No pneumothorax on left. Small bilateral pleural  effusions. There is an abnormal appearance of the left paratracheal soft  tissues with deviation of the  trachea to the right.       Impression:      1. Placement of a smallbore chest tube overlying the right apex with  decrease in size of right apical pneumothorax now measuring 18 mm at the  right apex, previously 37 mm.  2. Bilateral upper lobe consolidation and/or chronic scarring.  3. Abnormal fullness in the left paratracheal region, consider chest CT  follow-up to exclude mass or adenopathy.  4. Small bilateral pleural effusions.     Electronically Signed By-Kelton Cooney On:11/1/2020 7:09 AM  This report was finalized on [id number] by  Kelton Cooney, .          ASSESSMENT    Acute hypoxic respiratory failure, likely multifactorial with pneumothorax and pulmonary fibrosis and exacerbated by pain at chest tube insertion site  Right pneumothorax, recurrence following a biopsy 10/22/2020  Pulmonary fibrosis  Breast cancer  Seizures  Anxiety disorder  Bifrontal glioma    PLAN    O2 per nasal cannula, titrate to maintain a saturation >95 %, currently 5 L nasal cannula  Chest tube placed with near resolution of pneumothorax. Leave chest tube to suction until PTX completely resolved.   Pain management with as needed Dilaudid.    Can start po diet   Thoracic surgery consulted  Patient is currently on chemotherapy with tamoxifen which has been continued per primary  Continue Trileptal and Topamax per primary    See orders. The plan was discussed with the patient    I thank you for this opportunity to take part in this patient's care and will follow the patient along with you.    Attending physician statement:  High risk patient   Above note scribed by nurse practitioner for me and later reviewed for accuracy. I've examined the patient and reviewed all labs and images.   I have directly participated in the evaluation and management of this patient.  Marvin Jorge MD  Pulmonary and [facility]  [facility]

## 2020-11-01 NOTE — ED PROVIDER NOTES
Subjective   History of Present Illness  55-year-old female with a history of pulmonary fibrosis had a pneumothorax last week that required a chest tube in the right side of the chest as well as a smaller pneumothorax catheter tip at [facility].  The patient was discharged just 2 to 3 days ago.  She has been very weak at home.  Tonight she developed sudden shortness of breath and stated that she had pain in the right side of the chest.  The patient presented to the emergency department short of breath and stated that she could barely talk  Review of Systems  Patient could not provide a review of systems but her brother states that she has a history of a lumpectomy from the left breast about 2 years ago with no evidence of any recurrence.  He also states that she had brain cancer about 10 years ago with no recurrence  Past Medical History:   Diagnosis Date   • Cancer (CMS/HCC)     left breast       Allergies   Allergen Reactions   • Aspirin Anaphylaxis     UPSETS STOMACH       No past surgical history on file.    No family history on file.    Social History     Socioeconomic History   • Marital status:      Spouse name: Not on file   • Number of children: Not on file   • Years of education: Not on file   • Highest education level: Not on file           Objective   Physical Exam  On exam she is awake but lethargic her heart rate was 144 her O2 sats on a mask were 100% blood pressure 129/78 respirations were 27 the HEENT exam was unremarkable her neck is supple the chest shows a dressing in place in the right upper chest where she had a previous chest tube.  The patient also has an old scar to the right lateral chest where she had a previous chest tube that was probably about a 30 [place].  The patient does have breath sounds but they seem to be diminished on the right side at the superior aspect the cardiovascular exam reveals a regular rhythm without a gallop or murmur the abdomen is soft and nontender  the patient has no cyanosis clubbing or edema  Procedures     The patient had the right side of the upper chest prepped in a sterile fashion.  An area adjacent to the previous pneumothorax catheter was infiltrated with 2 cc of 1% Xylocaine.  The skin was  with a small 11 blade.  The patient had a pneumothorax catheter tip placed into the right anterior third rib and costal space.  Air was returned.  This was hooked up to 20 cm of water suction and the post procedure film showed that there has been complete reexpansion of the pneumothorax.  Sterile dressing was applied.      ED Course         Results for orders placed or performed during the hospital encounter of 10/31/20   Comprehensive Metabolic Panel    Specimen: Blood   Result Value Ref Range    Glucose 291 (H) 65 - 99 mg/dL    BUN 14 6 - 20 mg/dL    Creatinine 1.01 (H) 0.57 - 1.00 mg/dL    Sodium 139 136 - 145 mmol/L    Potassium 3.1 (L) 3.5 - 5.2 mmol/L    Chloride 100 98 - 107 mmol/L    CO2 21.0 (L) 22.0 - 29.0 mmol/L    Calcium 8.8 8.6 - 10.5 mg/dL    Total Protein 6.9 6.0 - 8.5 g/dL    Albumin 3.40 (L) 3.50 - 5.20 g/dL    ALT (SGPT) 29 1 - 33 U/L    AST (SGOT) 73 (H) 1 - 32 U/L    Alkaline Phosphatase 101 39 - 117 U/L    Total Bilirubin 0.2 0.0 - 1.2 mg/dL    eGFR Non African Amer 57 (L) >60 mL/min/1.73    Globulin 3.5 gm/dL    A/G Ratio 1.0 g/dL    BUN/Creatinine Ratio 13.9 7.0 - 25.0    Anion Gap 18.0 (H) 5.0 - 15.0 mmol/L   Protime-INR    Specimen: Blood   Result Value Ref Range    Protime 11.7 9.6 - 11.7 Seconds    INR 1.07 0.93 - 1.10   aPTT    Specimen: Blood   Result Value Ref Range    PTT 23.6 (L) 24.0 - 31.0 seconds   CBC Auto Differential    Specimen: Blood   Result Value Ref Range    WBC 11.40 (H) 3.40 - 10.80 10*3/mm3    RBC 3.73 (L) 3.77 - 5.28 10*6/mm3    Hemoglobin 12.0 12.0 - 15.9 g/dL    Hematocrit 35.6 34.0 - 46.6 %    MCV 95.4 79.0 - 97.0 fL    MCH 32.0 26.6 - 33.0 pg    MCHC 33.5 31.5 - 35.7 g/dL    RDW 11.9 (L) 12.3 - 15.4 %     RDW-SD 39.4 37.0 - 54.0 fl    MPV 7.0 6.0 - 12.0 fL    Platelets 298 140 - 450 10*3/mm3   Scan Slide    Specimen: Blood   Result Value Ref Range    Scan Slide     Manual Differential    Specimen: Blood   Result Value Ref Range    Neutrophil % 60.0 42.7 - 76.0 %    Lymphocyte % 29.0 19.6 - 45.3 %    Monocyte % 1.0 (L) 5.0 - 12.0 %    Eosinophil % 6.0 0.3 - 6.2 %    Bands %  4.0 0.0 - 5.0 %    Neutrophils Absolute 7.30 (H) 1.70 - 7.00 10*3/mm3    Lymphocytes Absolute 3.31 (H) 0.70 - 3.10 10*3/mm3    Monocytes Absolute 0.11 0.10 - 0.90 10*3/mm3    Eosinophils Absolute 0.68 (H) 0.00 - 0.40 10*3/mm3    RBC Morphology Normal Normal    Toxic Granulation Slight/1+ None Seen    Platelet Morphology Normal Normal   ECG 12 Lead   Result Value Ref Range    QT Interval 329 ms   Gold Top - SST   Result Value Ref Range    Extra Tube Hold for add-ons.      Medications   sodium chloride 0.9 % flush 10 mL (has no administration in time range)   ceFAZolin 1 gm IVPB in 100 mL NS (MBP) (1 g Intravenous New Bag 10/31/20 2300)     No radiology results for the last day                                       MDM  The patient had an elevated glucose of 291 which the family states has not been present previously.  Her white count was slightly elevated at 11,400.  Her CMP showed a low potassium at 3.1 and this was replaced.  Patient was also given a dose of Kefzol.  The patient will be admitted in consultation with thoracic surgery obtain  Final diagnoses:   Pneumothorax on right   Hypokalemia   Pulmonary fibrosis (CMS/HCC)            Rylan Dominguez MD  10/31/20 6513

## 2020-11-01 NOTE — PLAN OF CARE
Had dyspneic episodes with exertion with desaturations in the low 80s.  NRB used PRN to bring back to mid 90s. Had an acute pain episode with sharp pains in the right lateral side with visible labored respirations with use of accessory muscles.  Breath sounds were clear and diminished bilateral bases.  STAT CXR done about 0620 & STAT Consults called out.

## 2020-11-01 NOTE — H&P
[facility] Medicine Services      Patient Name: Shannan Laboy  : 1965  MRN: [id number]  Primary Care Physician: Dionisio Sanders MD  Date of admission: 10/31/2020    Patient Care Team:  Dionisio Sanders MD as PCP - General          Subjective   History Present Illness     Chief Complaint:   Chief Complaint   Patient presents with   • Shortness of Breath       Ms. Laboy is a 55 y.o. female who presents to [facility] ED with a history of pulmonary fibrosis, brain cancer, and breast cancer complaining of shortness of breath.  Patient was sitting at home watching television and had a sudden episode of extreme shortness of breath.  She denies any coughing, chest pain, fever, and chills.    In the ED, glucose 291, potassium 3.1, anion gap 18.0, BUN 14, creatinine 1.01, AST 73, PTT 23.6, WBCs 11.40.  Chest x-ray pending radiology review, shows right-sided pneumothorax.  EKG shows sinus tachycardia with a rate of 133 with abnormal T waves per EKG machine review, awaiting physical review; unable to visualize EKG following epic downtime.  Chest tube placed in ED; thoracic surgery consult per ED.  Patient admitted for further evaluation and treatment.        Review of Systems   Constitution: Negative for chills and fever.   Cardiovascular: Negative for chest pain.   Respiratory: Positive for shortness of breath. Negative for cough.    Gastrointestinal: Negative for nausea and vomiting.   All other systems reviewed and are negative.        Personal History     Past Medical History:   Past Medical History:   Diagnosis Date   • Bifrontal or frontal glioma 2002   • Breast cancer of upper-outer quadrant of left female breast (CMS/HCC) 2016   • Cancer (CMS/HCC)     left breast   • Mixed anxiety and depressive disorder 2016   • Postprocedural pneumothorax 10/22/2020   • Pulmonary fibrosis (CMS/HCC) 10/14/2020    Added automatically from request for surgery [id number]   •  Seizures (CMS/HCC) 2/4/2014       Surgical History:    History reviewed. No pertinent surgical history.      Family History: family history includes No Known Problems in her father and mother. Otherwise pertinent FHx was reviewed and unremarkable.     Social History:  reports that she has never smoked. She has never used smokeless tobacco. She reports that she does not drink alcohol or use drugs.      Medications:  Prior to Admission medications    Medication Sig Start Date End Date Taking? Authorizing Provider   OXcarbazepine (TRILEPTAL) 300 MG tablet Take 900 mg by mouth 2 (Two) Times a Day.   Yes Elma Torres MD   tamoxifen (NOLVADEX) 20 MG chemo tablet Take 20 mg by mouth Daily.   Yes Elma Torres MD   topiramate (TOPAMAX) 50 MG tablet Take 75 mg by mouth 2 (Two) Times a Day.   Yes Elma Torres MD       Allergies:    Allergies   Allergen Reactions   • Aspirin Anaphylaxis     UPSETS STOMACH       Objective   Objective     Vital Signs  Temp:  [98.7 °F (37.1 °C)] 98.7 °F (37.1 °C)  Heart Rate:  [] 133  Resp:  [27] 27  BP: (104-129)/(60-82) 104/78  SpO2:  [97 %-100 %] 98 %  on   ;   Device (Oxygen Therapy): nonrebreather mask  Body mass index is 21.95 kg/m².    Physical Exam  Vitals signs reviewed.   Constitutional:       Appearance: She is ill-appearing and diaphoretic.   HENT:      Head: Normocephalic and atraumatic.      Mouth/Throat:      Mouth: Mucous membranes are moist.      Pharynx: Oropharynx is clear.   Eyes:      Extraocular Movements: Extraocular movements intact.      Conjunctiva/sclera: Conjunctivae normal.      Pupils: Pupils are equal, round, and reactive to light.   Neck:      Musculoskeletal: Normal range of motion.   Cardiovascular:      Rate and Rhythm: Regular rhythm. Tachycardia present.      Pulses: Normal pulses.      Heart sounds: Normal heart sounds.   Pulmonary:      Effort: Pulmonary effort is normal.      Breath sounds: Normal breath sounds.   Abdominal:       General: Bowel sounds are normal.      Palpations: Abdomen is soft.   Musculoskeletal: Normal range of motion.   Skin:     General: Skin is warm.      Capillary Refill: Capillary refill takes less than 2 seconds.      Coloration: Skin is pale.   Neurological:      Mental Status: She is alert and oriented to person, place, and time.         Results Review:  I have personally reviewed most recent lab results and radiology images and interpretations and agree with findings, most notably: X-ray results, glucose, potassium.    Results from last 7 days   Lab Units 10/31/20  2215   WBC 10*3/mm3 11.40*   HEMOGLOBIN g/dL 12.0   HEMATOCRIT % 35.6   PLATELETS 10*3/mm3 298   INR  1.07     Results from last 7 days   Lab Units 10/31/20  2215   SODIUM mmol/L 139   POTASSIUM mmol/L 3.1*   CHLORIDE mmol/L 100   CO2 mmol/L 21.0*   BUN mg/dL 14   CREATININE mg/dL 1.01*   GLUCOSE mg/dL 291*   CALCIUM mg/dL 8.8   ALT (SGPT) U/L 29   AST (SGOT) U/L 73*     Estimated Creatinine Clearance: 53.4 mL/min (A) (by C-G formula based on SCr of 1.01 mg/dL (H)).  Brief Urine Lab Results     None          Microbiology Results (last 10 days)     ** No results found for the last 240 hours. **          ECG/EMG Results (most recent)     Procedure Component Value Units Date/Time    ECG 12 Lead [[id number]] Collected: 10/31/20 2238     Updated: 10/31/20 2240     QT Interval 329 ms     Narrative:      HEART RATE= 133  bpm  RR Interval= 452  ms  MT Interval= 161  ms  P Horizontal Axis= 37  deg  P Front Axis= 80  deg  QRSD Interval= 88  ms  QT Interval= 329  ms  QRS Axis= 78  deg  T Wave Axis= -81  deg  - ABNORMAL ECG -  Sinus tachycardia  Borderline ST depression, diffuse leads  Abnormal T, consider ischemia, diffuse leads  Electronically Signed By:   Date and Time of Study: 2020-10-31 22:38:06                No radiology results for the last 7 days      Estimated Creatinine Clearance: 53.4 mL/min (A) (by C-G formula based on SCr of 1.01 mg/dL  (H)).    Assessment/Plan   Assessment/Plan       Active Hospital Problems    Diagnosis  POA   • **Pneumothorax on right [J93.9]  Yes     Priority: High   • Hypokalemia [E87.6]  Yes   • Elevated glucose [R73.09]  Yes   • Pulmonary fibrosis (CMS/HCC) [J84.10]  Yes   • Breast cancer of upper-outer quadrant of left female breast (CMS/HCC) [C50.412]  Yes   • Seizures (CMS/HCC) [R56.9]  Yes      Resolved Hospital Problems   No resolved problems to display.     Pneumothorax, right  -Chest tube placed in ED  -Thoracic surgery consulted per ED  -Continue Kefzol  -NPO for now  -LR @ 150cc/hr     Hypokalemia, mild  -K+ replacement per protocol  -Repeat BMP in am    Elevated blood glucose level  -Glucose 291  -A1C in a.m.  -Repeat BMP in am    Pulmonary fibrosis  -No current medications    Breast cancer  -Continue tamoxifen    Seizures  -Status post brain cancer  -Continue Trileptal and Topamax      VTE Prophylaxis -   Mechanical Order History:     SCDs      Pharmalogical Order History:     None          CODE STATUS:    Code Status and Medical Interventions:   Ordered at: 11/01/20 0011     Code Status:    CPR     Medical Interventions (Level of Support Prior to Arrest):    Full       This patient has been examined wearing appropriate Personal Protective Equipment . 10/31/20      I discussed the patient's findings and my recommendations with patient.      Signature: Electronically signed by DANN Bonilla, 11/01/20, 6:03 AM EST.      [facility]ist Team

## 2020-11-01 NOTE — ED NOTES
Pt arrived from home for increased SOA; noted per EMS; pt was just dcd from [facility] for collapsed lung, and pulmonary fibrosis. 02 noted %50 upon arrival tachpnea not moving air. MD and xray called to room stat for TX. See new orders. Pt status and vs improving after chest tube placement.      Juan F Wu, KONG  10/31/20 2234       Juan F Wu RN  10/31/20 7263

## 2020-11-01 NOTE — CONSULTS
Consults    Patient Care Team:  Provider, No Known as PCP - General    Chief Complaint   Patient presents with   • Shortness of Breath     Recurrent right pneumothorax  Biapical pulmonary fibrosis  Chest tube management    Subjective     History of Present Illness     This pleasant lady is a 55-year-old  female.  In 2001 she had a brain malignancy treated with surgery radiation and chemotherapy.  She is followed for 5 years with no recurrence and was discharged from neurosurgery care.  In 2014 she was diagnosed with a left breast cancer and underwent surgical resection and radiation therapy.  She was then on tamoxifen for 5 years with no recurrence.  She has had an insignificant smoking history but has had significant secondhand smoke exposure.  She is worked at [employer] for more than 20 years.  She has developed progressive shortness of breath and was seen by Dr. Bhavesh Alaniz of pulmonary medicine.  Work-up showed by apical pulmonary fibrosis.  She was referred to Dr. Malinda Gottlieb who performed a bronchoscopy with transbronchial biopsies.  Post procedure the patient had a pneumothorax and was admitted to the hospital.  2 chest tubes were placed with partial reexpansion of the lung and persistent air leak.  Thoracic surgery was consulted and recommended no surgical intervention.  Airleak stopped but the pneumothorax persisted.  Chest tubes were removed and the patient was observed with stable chest x-ray.  Patient was discharged home with a apical space.  Patient was slowly improving at home when she developed sudden increase in shortness of breath.  She was brought to the emergency room yesterday and chest x-ray showed an increase in the pneumothorax.  Chest tube was placed with a good resolution of the pneumothorax.  She has been admitted to the hospital and we are consulted for management of the recurrent pneumothorax.    Review of Systems   Respiratory: Positive for shortness of breath.     All other systems reviewed and are negative.       Patient Active Problem List   Diagnosis   • Pneumothorax on right   • Bifrontal or frontal glioma   • Breast cancer of upper-outer quadrant of left female breast (CMS/HCC)   • Mixed anxiety and depressive disorder   • Postprocedural pneumothorax   • Pulmonary fibrosis (CMS/HCC)   • Seizures (CMS/HCC)   • Hypokalemia   • Elevated glucose     Past Medical History:   Diagnosis Date   • Bifrontal or frontal glioma 6/20/2002   • Breast cancer of upper-outer quadrant of left female breast (CMS/HCC) 5/16/2016   • Cancer (CMS/HCC)     left breast   • Mixed anxiety and depressive disorder 4/8/2016   • Postprocedural pneumothorax 10/22/2020   • Pulmonary fibrosis (CMS/HCC) 10/14/2020    Added automatically from request for surgery [id number]   • Seizures (CMS/HCC) 2/4/2014     History reviewed. No pertinent surgical history.  Family History   Problem Relation Age of Onset   • No Known Problems Mother    • No Known Problems Father      Social History     Socioeconomic History   • Marital status:      Spouse name: Not on file   • Number of children: Not on file   • Years of education: Not on file   • Highest education level: Not on file   Tobacco Use   • Smoking status: Never Smoker   • Smokeless tobacco: Never Used   Substance and Sexual Activity   • Alcohol use: Never     Frequency: Never   • Drug use: Never   • Sexual activity: Defer     Medications Prior to Admission   Medication Sig Dispense Refill Last Dose   • OXcarbazepine (TRILEPTAL) 600 MG tablet Take 900 mg by mouth 2 (Two) Times a Day.      • tamoxifen (NOLVADEX) 20 MG chemo tablet Take 20 mg by mouth Daily.      • topiramate (TOPAMAX) 50 MG tablet Take 75 mg by mouth 2 (Two) Times a Day.        Allergies   Allergen Reactions   • Aspirin Anaphylaxis     UPSETS STOMACH       Objective      Vital Signs  Temp:  [98 °F (36.7 °C)-98.7 °F (37.1 °C)] 98 °F (36.7 °C)  Heart Rate:  [] 124  Resp:  [13-40] 20  BP:  ()/(60-86) 117/83    Intake & Output (last day)       10/31 0701 - 11/01 0700 11/01 0701 - 11/02 0700    P.O.  0    I.V. (mL/kg) 680 (12.5)     Total Intake(mL/kg) 680 (12.5) 0 (0)    Urine (mL/kg/hr) 350     Total Output 350     Net +330 0                Physical Exam  Constitutional:       Appearance: Normal appearance. She is well-developed and underweight. She is ill-appearing.      Interventions: Nasal cannula in place.      Comments: Patient appears short of breath even though she is on oxygen by nasal cannula with O2 saturations of 90 to 92%.   HENT:      Head: Normocephalic.   Eyes:      General: Lids are normal.      Conjunctiva/sclera: Conjunctivae normal.      Pupils: Pupils are equal, round, and reactive to light.   Neck:      Musculoskeletal: Normal range of motion and neck supple.      Thyroid: No thyroid mass or thyromegaly.      Vascular: No carotid bruit, hepatojugular reflux or JVD.      Trachea: Trachea normal.   Cardiovascular:      Rate and Rhythm: Normal rate and regular rhythm.  No extrasystoles are present.     Chest Wall: PMI is not displaced.      Pulses: Normal pulses.      Heart sounds: Normal heart sounds, S1 normal and S2 normal.   Pulmonary:      Effort: Pulmonary effort is normal.      Breath sounds: Examination of the right-upper field reveals decreased breath sounds. Decreased breath sounds present.   Chest:      Comments: Percutaneous catheter in the right second intercostal space midclavicular line is connected to a Pleur-evac which is on -20 cm suction.  There is no air leak with good forceful cough.  There has been minimal drainage from the pleural catheter.  Abdominal:      General: Bowel sounds are normal.      Palpations: Abdomen is soft. There is no mass.      Tenderness: There is no abdominal tenderness.      Hernia: No hernia is present.   Musculoskeletal: Normal range of motion.   Skin:     General: Skin is warm and dry.   Neurological:      Mental Status: She is  alert and oriented to person, place, and time.      Cranial Nerves: No cranial nerve deficit.      Sensory: No sensory deficit.      Deep Tendon Reflexes: Reflexes are normal and symmetric.   Psychiatric:         Speech: Speech normal.         Behavior: Behavior normal.         Thought Content: Thought content normal.         Judgment: Judgment normal.         Results Review:    I reviewed the patient's new clinical results.  I reviewed the patient's new imaging results and agree with the interpretation.    Imaging Results (Last 24 Hours)     Procedure Component Value Units Date/Time    XR Chest 1 View [[id number]] Collected: 11/01/20 0743     Updated: 11/01/20 0747    Narrative:         DATE OF EXAM:   11/1/2020 6:24 AM     PROCEDURE:   XR CHEST 1 VW-     INDICATIONS:   soa; J93.9-Pneumothorax, unspecified; E87.6-Hypokalemia;  J84.10-Pulmonary fibrosis, unspecified     COMPARISON:  10/31/2020     TECHNIQUE:   Portable chest radiograph.     FINDINGS:    There is a smallbore right chest tube in place. There is a suspected  small residual right apical pneumothorax. Heart size enlarged. No  pneumothorax on the left. There are small bilateral pleural effusions.  There are suspected areas of chronic scarring at the left and right lung  apex. Stable fullness in left paratracheal soft tissues.       Impression:      1. Right smallbore chest tube in place with suspected small residual  right apical pneumothorax.  2. Abnormal appearance of the left and right lung apex which may relate  to chronic scarring and/or consolidation.   3. Left paratracheal soft tissue fullness again noted and could be  further assessed with cross-sectional imaging.  3. Small bilateral pleural effusions.     Electronically Signed By-Kelton Cooney On:11/1/2020 7:45 AM  This report was finalized on [id number] by  Kelton Cooney, .    XR Chest 1 View [[id number]] Collected: 11/01/20 0712     Updated: 11/01/20 0715    Narrative:         DATE OF EXAM:    10/31/2020 9:57 PM     PROCEDURE:   XR CHEST 1 VW-     INDICATIONS:   Shortness of air     COMPARISON:  No Comparisons Available     TECHNIQUE:   Portable chest radiograph.     FINDINGS:    There is a moderate right apical pneumothorax measuring 3.8 cm and the  right apex. The heart size is normal. No definite pneumothorax on the  left. There is a suspected chronic biapical scarring/consolidation  noted. Left paratracheal soft tissue fullness. Small bilateral pleural  effusions. Surgical clips overlie the left axilla.       Impression:      1. Moderate right apical pneumothorax measuring 3.8 cm.  2. Bilateral upper lobe consolidation and/or chronic scarring.  3. Fullness in the left paratracheal soft tissues, chest CT may be  helpful to further characterize.  4. Small bilateral pleural effusions.     Electronically Signed By-Kelton Cooney On:11/1/2020 7:13 AM  This report was finalized on [id number] by  Kelton Cooney, .    XR Chest 1 View [[id number]] Collected: 11/01/20 0706     Updated: 11/01/20 0711    Narrative:         DATE OF EXAM:   10/31/2020 10:26 PM     PROCEDURE:   XR CHEST 1 VW-     INDICATIONS:   Post chest tube insertion     COMPARISON:  10/31/2020     TECHNIQUE:   Portable chest radiograph.     FINDINGS:    Interval placement of a smallbore chest tube overlying the right apex  with improved aeration of the right lung. There is persistent pleural  fluid and air at the right apex. Residual right apical pneumothorax  measures 18 mm from the right apex, previously 37 mm. There is biapical  consolidation/scarring. No pneumothorax on left. Small bilateral pleural  effusions. There is an abnormal appearance of the left paratracheal soft  tissues with deviation of the trachea to the right.       Impression:      1. Placement of a smallbore chest tube overlying the right apex with  decrease in size of right apical pneumothorax now measuring 18 mm at the  right apex, previously 37 mm.  2. Bilateral upper lobe  consolidation and/or chronic scarring.  3. Abnormal fullness in the left paratracheal region, consider chest CT  follow-up to exclude mass or adenopathy.  4. Small bilateral pleural effusions.     Electronically Signed By-Kelton Cooney On:11/1/2020 7:09 AM  This report was finalized on [id number] by  Kelton Cooney, .          Lab Results:  Lab Results (last 24 hours)     Procedure Component Value Units Date/Time    Basic Metabolic Panel [[id number]]  (Abnormal) Collected: 11/01/20 0313    Specimen: Blood Updated: 11/01/20 0421     Glucose 121 mg/dL      BUN 12 mg/dL      Creatinine 0.73 mg/dL      Sodium 143 mmol/L      Potassium 3.8 mmol/L      Chloride 106 mmol/L      CO2 24.0 mmol/L      Calcium 8.8 mg/dL      eGFR Non African Amer 82 mL/min/1.73      BUN/Creatinine Ratio 16.4     Anion Gap 13.0 mmol/L     Narrative:      GFR Normal >60  Chronic Kidney Disease <60  Kidney Failure <15      CBC Auto Differential [[id number]]  (Abnormal) Collected: 11/01/20 0313    Specimen: Blood Updated: 11/01/20 0403     WBC 13.70 10*3/mm3      RBC 4.08 10*6/mm3      Hemoglobin 12.8 g/dL      Hematocrit 39.2 %      MCV 96.1 fL      MCH 31.3 pg      MCHC 32.5 g/dL      RDW 12.4 %      RDW-SD 42.0 fl      MPV 7.4 fL      Platelets 280 10*3/mm3      Neutrophil % 84.2 %      Lymphocyte % 10.3 %      Monocyte % 4.8 %      Eosinophil % 0.4 %      Basophil % 0.3 %      Neutrophils, Absolute 11.60 10*3/mm3      Lymphocytes, Absolute 1.40 10*3/mm3      Monocytes, Absolute 0.70 10*3/mm3      Eosinophils, Absolute 0.00 10*3/mm3      Basophils, Absolute 0.00 10*3/mm3      nRBC 0.0 /100 WBC     Hemoglobin A1c [[id number]] Collected: 11/01/20 0313    Specimen: Blood Updated: 11/01/20 0352    Extra Tubes [[id number]] Collected: 10/31/20 5625    Specimen: Blood, Venous Line Updated: 10/31/20 2034    Narrative:      The following orders were created for panel order Extra Tubes.  Procedure                               Abnormality         Status                      ---------                               -----------         ------                     [facility] \[facility]\"" - SST[[id number]]                                   Final result                 Please view results for these tests on the individual orders.    [facility] - SST [[id number]] Collected: 10/31/20 2215    Specimen: Blood Updated: 10/31/20 2315     Extra Tube Hold for add-ons.     Comment: Auto resulted.       CBC & Differential [[id number]]  (Abnormal) Collected: 10/31/20 2215    Specimen: Blood Updated: 10/31/20 2308    Narrative:      The following orders were created for panel order CBC & Differential.  Procedure                               Abnormality         Status                     ---------                               -----------         ------                     CBC Auto Differential[[id number]]        Abnormal            Final result                 Please view results for these tests on the individual orders.    CBC Auto Differential [[id number]]  (Abnormal) Collected: 10/31/20 2215    Specimen: Blood Updated: 10/31/20 2308     WBC 11.40 10*3/mm3      RBC 3.73 10*6/mm3      Hemoglobin 12.0 g/dL      Hematocrit 35.6 %      MCV 95.4 fL      MCH 32.0 pg      MCHC 33.5 g/dL      RDW 11.9 %      RDW-SD 39.4 fl      MPV 7.0 fL      Platelets 298 10*3/mm3     Scan Slide [[id number]] Collected: 10/31/20 2215    Specimen: Blood Updated: 10/31/20 2308     Scan Slide --     Comment: See Manual Differential Results       Manual Differential [[id number]]  (Abnormal) Collected: 10/31/20 2215    Specimen: Blood Updated: 10/31/20 2308     Neutrophil % 60.0 %      Lymphocyte % 29.0 %      Monocyte % 1.0 %      Eosinophil % 6.0 %      Bands %  4.0 %      Neutrophils Absolute 7.30 10*3/mm3      Lymphocytes Absolute 3.31 10*3/mm3      Monocytes Absolute 0.11 10*3/mm3      Eosinophils Absolute 0.68 10*3/mm3      RBC Morphology Normal     Toxic Granulation Slight/1+     Platelet Morphology Normal    Comprehensive Metabolic  Panel [[id number]]  (Abnormal) Collected: 10/31/20 2215    Specimen: Blood Updated: 10/31/20 2248     Glucose 291 mg/dL      BUN 14 mg/dL      Creatinine 1.01 mg/dL      Sodium 139 mmol/L      Potassium 3.1 mmol/L      Comment: Slight hemolysis detected by analyzer. Results may be affected.        Chloride 100 mmol/L      CO2 21.0 mmol/L      Calcium 8.8 mg/dL      Total Protein 6.9 g/dL      Albumin 3.40 g/dL      ALT (SGPT) 29 U/L      AST (SGOT) 73 U/L      Comment: Slight hemolysis detected by analyzer. Results may be affected.        Alkaline Phosphatase 101 U/L      Total Bilirubin 0.2 mg/dL      eGFR Non African Amer 57 mL/min/1.73      Globulin 3.5 gm/dL      A/G Ratio 1.0 g/dL      BUN/Creatinine Ratio 13.9     Anion Gap 18.0 mmol/L     Narrative:      GFR Normal >60  Chronic Kidney Disease <60  Kidney Failure <15      Protime-INR [[id number]]  (Normal) Collected: 10/31/20 2215    Specimen: Blood Updated: 10/31/20 2231     Protime 11.7 Seconds      INR 1.07    aPTT [[id number]]  (Abnormal) Collected: 10/31/20 2215    Specimen: Blood Updated: 10/31/20 2231     PTT 23.6 seconds               Assessment/Plan       Pneumothorax on right    Breast cancer of upper-outer quadrant of left female breast (CMS/HCC)    Pulmonary fibrosis (CMS/HCC)    Seizures (CMS/HCC)    Hypokalemia    Elevated glucose      Assessment & Plan    I discussed the patients findings and our recommendations with patient, family and nursing staff     Patient does have biapical scarring.  Suspect that the iatrogenic pneumothorax following the bronchoscopy never fully healed.  Patient had slow leak at home and developed a significant pneumothorax.  Will obtain a CT of the chest without contrast to assess the apical areas for bullous disease.  Will leave chest tube to -20 cm suction.  Once we have reviewed the CT of the chest we will be able to make further recommendations.  Patient may benefit from conservative management with the bedside  chemical pleurodesis.  On the other hand she may require VATS with stapling of the apical leak and a mechanical pleurodesis.    Thank you for this consult and allowing us to participate in the care of your patient.  We will follow along with you during this hospitalization.       Sukh Chadwick III, MD  [facility]  11/01/20  15:54 EST

## 2020-11-02 NOTE — PLAN OF CARE
Goal Outcome Evaluation:      Pt. has received Zofran for nausea and dilaudid for pain associated with her chest tube through put the shift. Pending the results of the CT Dr. Chadwick may or may not due a procedure to help close the pneumothorax. Will continue to monitor vitals.

## 2020-11-02 NOTE — PROGRESS NOTES
Discharge Planning Assessment  [facility]     Patient Name: Shannan Laboy  MRN: [id number]  Today's Date: 11/2/2020    Admit Date: 10/31/2020    Discharge Needs Assessment     Row Name 11/02/20 1204       Living Environment    Lives With  alone    Current Living Arrangements  home/apartment/condo    Quality of Family Relationships  supportive    Able to Return to Prior Arrangements  yes       Resource/Environmental Concerns    Resource/Environmental Concerns  none    Transportation Concerns  car, none       Transition Planning    Patient/Family Anticipates Transition to  home with family    Patient/Family Anticipated Services at Transition  none    Transportation Anticipated  family or friend will provide       Discharge Needs Assessment    Readmission Within the Last 30 Days  other (see comments) pt was d/c from [facility] 2-3 days ago for the same thing    Equipment Currently Used at Home  none    Discharge Facility/Level of Care Needs  acute rehab;home with home health        Discharge Plan     Row Name 11/02/20 1202       Plan    Plan  D/C Plan : Pending PT/OT evals and watch for home o2 needs .    Patient/Family in Agreement with Plan  yes    Plan Comments  Pt was admitted for a pneumothorax and had a chest tube placed and is on 5l of o2 . Pt was recently d/c'd from [facility] for the same thing , and refused to go to rehab .        Continued Care and Services - Admitted Since 10/31/2020    Coordination has not been started for this encounter.         Demographic Summary     Row Name 11/02/20 1202       General Information    Admission Type  inpatient    Arrived From  emergency department    Preferred Language  English     Used During This Interaction  no        Functional Status     Row Name 11/02/20 1204       Functional Status    Usual Activity Tolerance  moderate    Current Activity Tolerance  fair       Mental Status    General Appearance WDL  WDL       Mental Status Summary    Recent Changes in Mental  Status/Cognitive Functioning  no changes                   Kaylynn Stokes, RN

## 2020-11-02 NOTE — PROGRESS NOTES
"    Chief complaint shortness of breath    Subjective     She feels slightly better, no complaints pain improved breathing improved      Objective     Vital Signs  Visit Vitals  /58   Pulse 119   Temp 98.3 °F (36.8 °C) (Oral)   Resp 20   Ht 157.5 cm (62\")   Wt 54.2 kg (119 lb 7.8 oz)   SpO2 99%   BMI 21.85 kg/m²       Physical Exam:  Physical Exam   Constitutional:  oriented to person, place, and time. No distress.   HENT:   Head: Normocephalic and atraumatic.   Eyes: Conjunctivae and EOM are normal. Pupils are equal, round, and reactive to light.   Neck: No JVD present. No thyromegaly present.   Cardiovascular: Normal rate, regular rhythm, normal heart sounds and intact distal pulses. Exam reveals no gallop and no friction rub.  Chest tube in place  No murmur heard.  Pulmonary/Chest: Effort normal and breath sounds normal. No stridor. No respiratory distress.  has no wheezes.  has no rales.  exhibits no tenderness.   Abdominal: Soft. Bowel sounds are normal.  no distension and no mass. There is no tenderness. There is no rebound and no guarding. No hernia.   Musculoskeletal: Normal range of motion.   Lymphadenopathy:     no cervical adenopathy.   Neurological:  alert and oriented to person, place, and time. No cranial nerve deficit or sensory deficit. exhibits normal muscle tone.   Skin: No rash noted.  not diaphoretic.   Psychiatric:  normal mood and affect.   Vitals reviewed.    Physical Exam          Results Review:    CMP:  Lab Results   Component Value Date     (H) 08/04/2020    BUN 10 11/02/2020    CREATININE 0.58 11/02/2020    EGFRIFNONA 108 11/02/2020     11/02/2020    K 3.6 11/02/2020     11/02/2020    CALCIUM 8.1 (L) 11/02/2020    ALBUMIN 3.40 (L) 10/31/2020    BILITOT 0.2 10/31/2020    ALKPHOS 101 10/31/2020    AST 73 (H) 10/31/2020    ALT 29 10/31/2020     CBC:  Lab Results   Component Value Date    WBC 14.30 (H) 11/02/2020    RBC 3.29 (L) 11/02/2020    HGB 10.2 (L) 11/02/2020 "    HCT 31.3 (L) 11/02/2020    MCV 95.4 11/02/2020    MCH 30.9 11/02/2020    MCHC 32.4 11/02/2020    RDW 12.3 11/02/2020     11/02/2020         Medication Review:     Scheduled Meds:ceFAZolin, 1 g, Intravenous, Q8H  OXcarbazepine, 900 mg, Oral, Q12H  potassium chloride, 20 mEq, Oral, Once  sodium chloride, 10 mL, Intravenous, Q12H  tamoxifen, 20 mg, Oral, Daily  topiramate, 75 mg, Oral, Q12H      Continuous Infusions:   PRN Meds:.•  acetaminophen **OR** acetaminophen **OR** acetaminophen  •  bisacodyl  •  docusate sodium  •  HYDROmorphone  •  magnesium sulfate **OR** magnesium sulfate in D5W 1g/100mL (PREMIX)  •  melatonin  •  nitroglycerin  •  ondansetron **OR** ondansetron  •  potassium chloride **OR** potassium chloride **OR** potassium chloride  •  [COMPLETED] Insert peripheral IV **AND** sodium chloride  •  sodium chloride    Assessment/Plan   Pneumothorax on the right side, status post recent biopsy  Chest tube placed  Management per pulmonology and CT surgery  Continue pain management    Pulmonary fibrosis  Per pulmonology  Respiratory viral panel including Covid negative    Seizures  Continue Trileptal and Topamax    History of breast cancer  On tamoxifen    History of brain cancer  Glioma  Managed per heme-onc as an outpatient  Status post previous chemo and radiation    Hyperglycemia  No history of diabetes  Hemoglobin A1c pending  Since sliding scale Accu-Cheks    Acute kidney injury  Resolved with IV fluids    DVT PUD prophylaxis next    Plan as above        Rose Hopper MD  11/02/20  10:52 EST

## 2020-11-02 NOTE — PLAN OF CARE
Goal Outcome Evaluation:  Required pain management couple of times overnight.   On 5LPM NC and requires PRN NRB when in pain for low sats 80s.  Though her O2 saturations returned to normal after her last pain episode, she refused to wear a NC and preferred the NRB.  She is ventilating well with clear BS bilaterally.  She is increasingly being anxious during pain episodes.

## 2020-11-02 NOTE — NURSING NOTE
Pt O2 sat 83% after a coughing spell. She was on 15L HF NC. She stated she felt mucus in her throat but couldn't cough it up. Order obtained from Dr Jorge to NT suction her. Afterwards she was placed on NRB and 15 HF. She recovered to 98% and felt the mucus plug was gone. Dr Jorge ordered for pt to transfer to ICU for monitoring.

## 2020-11-02 NOTE — PROGRESS NOTES
"Pharmacy Antimicrobial Dosing Service    Subjective:  Shannan Laboy is a 55 y.o.female admitted with right-sided pneumothorax. Pharmacy has been consulted to dose Vancomycin and Cefepime for possible PNA.    PMH: pulmonary fibrosis, seizures, hx breast CA, hx brain CA      Assessment/Plan    1. Day #1 Vancomycin: Goal -600 mcg*h/mL.   Ordered load dose of 1250mg x1 (~23 mg/kg) followed by maintenance dosing of 1000mg Q12h (~19 mg/kg). Obtain trough 11/4 12:00, at steady state prior to 4th total dose (or sooner if clinically warranted). Obtain random level 11/4 05:00, for pharmacist to calculate patient-specific pharmacokinetic parameters and AUC.    2. Day #1 Cefepime: 2gm IV q8h for estCrCl > 60 mL/min.    Will continue to monitor drug levels, renal function, culture and sensitivities, and patient clinical status.       Objective:  Relevant clinical data and objective history reviewed:  157.5 cm (62\")   54.2 kg (119 lb 7.8 oz)   Ideal body weight: 50.1 kg (110 lb 7.2 oz)  Adjusted ideal body weight: 51.7 kg (114 lb 1.1 oz)  Body mass index is 21.85 kg/m².        Results from last 7 days   Lab Units 11/02/20  0238 11/01/20  0313 10/31/20  2215   CREATININE mg/dL 0.58 0.73 1.01*     Estimated Creatinine Clearance: 92.7 mL/min (by C-G formula based on SCr of 0.58 mg/dL).  I/O last 3 completed shifts:  In: 680 [I.V.:680]  Out: 350 [Urine:350]    Results from last 7 days   Lab Units 11/02/20  0238 11/01/20  0313 10/31/20  2215   WBC 10*3/mm3 14.30* 13.70* 11.40*     Temperature    11/01/20 2300 11/02/20 0557 11/02/20 1100   Temp: 99.5 °F (37.5 °C) 98.3 °F (36.8 °C) 97.5 °F (36.4 °C)     Baseline culture/source/susceptibility:  Microbiology Results (last 10 days)       Procedure Component Value - Date/Time    Respiratory Panel PCR w/COVID-19(SARS-CoV-2) LEANNE/ROZ/MELVIN/PAD/COR/MAD/ANGELICA In-House, NP Swab in UTM/VTM, 3-4 HR TAT - Swab, Nasopharynx [[id number]]  (Normal) Collected: 11/02/20 0843    Lab Status: Final result " Specimen: Swab from Nasopharynx Updated: 11/02/20 1001     ADENOVIRUS, PCR Not Detected     Coronavirus 229E Not Detected     Coronavirus HKU1 Not Detected     Coronavirus NL63 Not Detected     Coronavirus OC43 Not Detected     COVID19 Not Detected     Human Metapneumovirus Not Detected     Human Rhinovirus/Enterovirus Not Detected     Influenza A PCR Not Detected     Influenza A H1 Not Detected     Influenza A H1 2009 PCR Not Detected     Influenza A H3 Not Detected     Influenza B PCR Not Detected     Parainfluenza Virus 1 Not Detected     Parainfluenza Virus 2 Not Detected     Parainfluenza Virus 3 Not Detected     Parainfluenza Virus 4 Not Detected     RSV, PCR Not Detected     Bordetella pertussis pcr Not Detected     Bordetella parapertussis PCR Not Detected     Chlamydophila pneumoniae PCR Not Detected     Mycoplasma pneumo by PCR Not Detected    Narrative:      Fact sheet for providers: https://docs.[employer]/wp-content/uploads/KKT4208-3412-CP2.1-EUA-Provider-Fact-Sheet-3.pdf    Fact sheet for patients: https://docs.[employer]/wp-content/uploads/TWC3054-8144-YW4.1-EUA-Patient-Fact-Sheet-1.pdf             Anti-Infectives (From admission, onward)      Ordered     Dose/Rate Route Frequency Start Stop    11/02/20 1144  !Vancomycin Level Draw Needed     Ordering Provider: Marvin Jorge MD     Does not apply Once 11/04/20 1200      11/02/20 1144  !Vancomycin Level Draw Needed     Ordering Provider: Marvin Jorge MD     Does not apply Once 11/04/20 0500      11/02/20 1144  vancomycin (VANCOCIN) 1,000 mg in sodium chloride 0.9 % 250 mL IVPB     Ordering Provider: Marvin Jorge MD    1,000 mg Intravenous Every 12 Hours 11/03/20 0100 11/09/20 1259    11/02/20 1144  vancomycin 1250 mg/250 mL 0.9% NS IVPB ([facility])     Ordering Provider: Marvin Jorge MD    1,250 mg Intravenous Once 11/02/20 1230 11/02/20 1307    11/02/20 1142  cefepime 2 gm IVPB in 100 ml NS (MBP)     Ordering  Provider: Marvin Jorge MD    2 g  over 30 Minutes Intravenous Every 8 Hours 11/02/20 1200 11/09/20 1159    11/02/20 1059  Pharmacy to Dose Cefepime     Ordering Provider: Marvin Jorge MD     Does not apply Continuous PRN 11/02/20 1059 11/09/20 1058    11/02/20 1059  Pharmacy to dose vancomycin     Ordering Provider: Marvin Jorge MD     Does not apply Continuous PRN 11/02/20 1058 11/09/20 1057    10/31/20 2253  ceFAZolin 1 gm IVPB in 100 mL NS (MBP)     Ordering Provider: Rylan Dominguez MD    1 g  over 30 Minutes Intravenous Once 10/31/20 2255 11/01/20 0037            Mayte Friend, DelmarD, BCPS  11/02/20 15:03 EST

## 2020-11-02 NOTE — SIGNIFICANT NOTE
ST orders received d/t coughing with liquids. Pt currently on a regular diet. Per RN, pt not appropriate for swallow eval this date. ST will continue to follow and attempt swallow eval when deemed appropriate.

## 2020-11-02 NOTE — PROGRESS NOTES
KPA/PULM/CC PROGRESS NOTE       SUBJECTIVE    The patient presented to the emergency department last night for evaluation sudden onset of shortness of air and right sided chest pain which was sharp.  On arrival to the emergency department the patient was very short of air and had conversational dyspnea.  There is no report of fever, chills, nausea, vomiting, midsternal chest pain, cough, or expectoration.  Chest x-ray performed in the emergency department revealed a moderate right pneumothorax.  An emergent chest tube was placed in the emergency department and the patient had sudden relief of her shortness of air.  She was admitted for further evaluation and treatment     The patient has had a complicated recent medical history.  On 10/22/2020 she underwent a bronchoscopy with biopsy for confirmation of bilateral pulmonary fibrosis by Dr. Craig at [facility].  She had a post procedure right pneumothorax and had a chest tube placed.  She was then admitted for further treatment.  The chest tube did not resolve the pneumothorax and the second chest tube was placed on 10/25.  The patient had resolution of the pneumothorax and the chest tube discontinued.  The patient was able to be discharged home on 10/29/2020.    11/2/20 Increased FiO2 requirements. C/o SOA      REVIEW OF SYSTEMS    as above  OBJECTIVE    Vitals:    11/01/20 2300 11/02/20 0300 11/02/20 0500 11/02/20 0557   BP: 110/68 92/59  115/58   BP Location:       Patient Position:       Pulse: 115 120  119   Resp: 20 20 20   Temp: 99.5 °F (37.5 °C)   98.3 °F (36.8 °C)   TempSrc: Oral   Oral   SpO2: 91% 98%  99%   Weight:   54.2 kg (119 lb 7.8 oz)    Height:          Intake/Output last 3 shifts:  I/O last 3 completed shifts:  In: 680 [I.V.:680]  Out: 350 [Urine:350]  Intake/Output this shift:  No intake/output data recorded.    Intake/Output Summary (Last 24 hours) at 11/2/2020 1052  Last data filed at 11/1/2020 1906  Gross per 24 hour   Intake 0 ml   Output  --   Net 0 ml          Constitutional:  no acute distress, ill-appearing  Eyes:  PERRL, conjunctiva normal   HENT:  Atraumatic, external ears normal, nose normal  Neck- normal range of motion, no tenderness, supple   Respiratory:  No respiratory distress, normal breath sounds, bibasilar rales, no wheezing.  Right chest tube secured  Cardiovascular: Sinus tachycardia, no murmurs, no gallops, no rubs   GI:  Soft, nondistended, normal bowel sounds, nontender, no rebound, no guarding   : Deferred  Musculoskeletal:  No edema, no tenderness, no deformities  Integument:  Well hydrated, no rash, chest tube dressing  Neurologic:  Alert & oriented x 3, CN 2-12 normal, normal motor function, normal sensory function, no focal deficits noted   Psychiatric:  Speech and behavior appropriate        Scheduled Meds:ceFAZolin, 1 g, Intravenous, Q8H  OXcarbazepine, 900 mg, Oral, Q12H  potassium chloride, 20 mEq, Oral, Once  sodium chloride, 10 mL, Intravenous, Q12H  tamoxifen, 20 mg, Oral, Daily  topiramate, 75 mg, Oral, Q12H        Continuous Infusions:     PRN Meds:•  acetaminophen **OR** acetaminophen **OR** acetaminophen  •  bisacodyl  •  docusate sodium  •  HYDROmorphone  •  magnesium sulfate **OR** magnesium sulfate in D5W 1g/100mL (PREMIX)  •  melatonin  •  nitroglycerin  •  ondansetron **OR** ondansetron  •  potassium chloride **OR** potassium chloride **OR** potassium chloride  •  [COMPLETED] Insert peripheral IV **AND** sodium chloride  •  sodium chloride     LABS    CBC  Results from last 7 days   Lab Units 11/02/20 0238 11/01/20 0313 10/31/20  2215 10/27/20  0848   WBC 10*3/mm3 14.30* 13.70* 11.40* 8.87   RBC 10*6/mm3 3.29* 4.08 3.73* 3.76*   HEMOGLOBIN g/dL 10.2* 12.8 12.0 11.6*   HEMATOCRIT % 31.3* 39.2 35.6 35.5*   MCV fL 95.4 96.1 95.4 94.4   PLATELETS 10*3/mm3 223 280 298 289       CMP   Results from last 7 days   Lab Units 11/02/20 0238 11/01/20  0313 10/31/20  9533   SODIUM mmol/L 138 143 139   POTASSIUM mmol/L  3.6 3.8 3.1*   CHLORIDE mmol/L 103 106 100   CO2 mmol/L 23.0 24.0 21.0*   BUN mg/dL 10 12 14   CREATININE mg/dL 0.58 0.73 1.01*   GLUCOSE mg/dL 145* 121* 291*   ALBUMIN g/dL  --   --  3.40*   BILIRUBIN mg/dL  --   --  0.2   ALK PHOS U/L  --   --  101   AST (SGOT) U/L  --   --  73*   ALT (SGPT) U/L  --   --  29       TROPONIN  Results from last 7 days   Lab Units 10/26/20  1752   TROPONIN I ng/mL <0.012       CoAg  Results from last 7 days   Lab Units 10/31/20  2215   INR  1.07   APTT seconds 23.6*       ABG        Microbiology  Microbiology Results (last 10 days)     Procedure Component Value - Date/Time    Respiratory Panel PCR w/COVID-19(SARS-CoV-2) LEANNE/ROZ/MELVIN/PAD/COR/MAD/ANGELICA In-House, NP Swab in UTM/VTM, 3-4 HR TAT - Swab, Nasopharynx [[id number]]  (Normal) Collected: 11/02/20 0843    Lab Status: Final result Specimen: Swab from Nasopharynx Updated: 11/02/20 1001     ADENOVIRUS, PCR Not Detected     Coronavirus 229E Not Detected     Coronavirus HKU1 Not Detected     Coronavirus NL63 Not Detected     Coronavirus OC43 Not Detected     COVID19 Not Detected     Human Metapneumovirus Not Detected     Human Rhinovirus/Enterovirus Not Detected     Influenza A PCR Not Detected     Influenza A H1 Not Detected     Influenza A H1 2009 PCR Not Detected     Influenza A H3 Not Detected     Influenza B PCR Not Detected     Parainfluenza Virus 1 Not Detected     Parainfluenza Virus 2 Not Detected     Parainfluenza Virus 3 Not Detected     Parainfluenza Virus 4 Not Detected     RSV, PCR Not Detected     Bordetella pertussis pcr Not Detected     Bordetella parapertussis PCR Not Detected     Chlamydophila pneumoniae PCR Not Detected     Mycoplasma pneumo by PCR Not Detected    Narrative:      Fact sheet for providers: https://docs.[employer]/wp-content/uploads/CDA8704-5943-ST8.1-EUA-Provider-Fact-Sheet-3.pdf    Fact sheet for patients: https://docs.[employer]/wp-content/uploads/XWO6169-7598-SD7.1-EUA-Patient-Fact-Sheet-1.pdf           IMAGING & OTHER STUDIES    Imaging Results (Last 72 Hours)     Procedure Component Value Units Date/Time    XR Abdomen KUB [[id number]] Collected: 11/02/20 0955     Updated: 11/02/20 0958    Narrative:      DATE OF EXAM:  11/2/2020 8:35 AM     PROCEDURE:  XR ABDOMEN KUB-     INDICATIONS:  air pocket on CXR; J93.9-Pneumothorax, unspecified; E87.6-Hypokalemia;  J84.10-Pulmonary fibrosis, unspecified     COMPARISON:  Chest radiograph dated 11/2/2020     TECHNIQUE:   Single radiographic view of the abdomen was obtained.     FINDINGS:  The gas below left hemidiaphragm appears within the gas-filled stomach.  There is a nonspecific nonobstructive small bowel gas pattern. There is  only a small amount of gas within the cecum. There is no significant  colonic stool burden. There is a punctate calcification projecting over  the lower pole of the right kidney and possible stone within the midpole  of the left kidney.       Impression:      1. Gas below the left hemidiaphragm appears within the stomach.  2. Nonobstructive small bowel gas pattern.  3. Question small renal stones.     Electronically Signed By-Kanu Ram On:11/2/2020 9:56 AM  This report was finalized on [id number] by  Kanu Ram, .    XR Chest 1 View [[id number]] Collected: 11/02/20 0749     Updated: 11/02/20 0753    Narrative:      EXAMINATION: XR CHEST 1 VW-     DATE OF EXAM: 11/2/2020 5:07 AM     INDICATION: Pneumothorax; J93.9-Pneumothorax, unspecified;  E87.6-Hypokalemia; J84.10-Pulmonary fibrosis, unspecified.     COMPARISON: Chest radiograph dated 11/1/2020, chest CT dated 11/1/2020     TECHNIQUE: Portable AP view of the chest was obtained.     FINDINGS:  There is a small bore right-sided chest tube in unchanged position.  There is right apical pleural thickening or effusion without visible  pneumothorax. There is similar appearance of the biapical  pleural-parenchymal airspace opacity better evaluated on the recently  performed chest  CT from 11/1/2020. There is no pleural effusion. The  cardiomediastinal silhouette appears unchanged.       Impression:      1. Small bore right-sided chest tube in unchanged position with right  apical pleural thickening/effusion.  2. Biapical pleural parenchymal scarring and upper lobe bronchiectasis  better evaluated on the recently performed chest CT from 11/1/2020     Electronically Signed By-Kanu Ram On:11/2/2020 7:51 AM  This report was finalized on [id number] by  Kanu Ram, .    CT Chest Without Contrast [[id number]] Collected: 11/01/20 2027     Updated: 11/01/20 2044    Narrative:         DATE OF EXAM:  11/1/2020 4:35 PM     PROCEDURE:  CT CHEST WO CONTRAST-     INDICATIONS:   Patient with recurrent pneumothorax and apical scarring on plain chest  x-ray.; J93.9-Pneumothorax, unspecified; E87.6-Hypokalemia;  J84.10-Pulmonary fibrosis, unspecified 55-year-old     COMPARISON:   Chest radiograph dated 11/01/2020, and 10/31/2020     TECHNIQUE:  Routine transaxial slices were obtained through the chest without the  administration of intravenous contrast. Reconstructed coronal and  sagittal images were also obtained. Automated exposure control and  iterative construction methods were used.     FINDINGS:  A small caliber right chest tube in place. There is no pneumothorax.  Biapical opacities and interstitial thickening with hilar retraction and  bronchiectasis have the appearance of chronic fibrotic changes which can  be seen with progressive massive fibrosis. There are also scattered  nonspecific groundglass opacities throughout the lungs right greater  than left. No suspicious pulmonary nodules. No pleural or pericardial  effusions. The left paratracheal fullness on the chest radiograph part  of the chronic appearing consolidations and fibrosis. No discrete  pathologically enlarged lymph nodes are identified the lack of IV  contrast does lower sensitivity for detecting this. There is  mild  ectasia of the descending thoracic aorta. Limited images of the upper  abdomen demonstrate normal adrenal glands. No acute bony abnormality or  aggressive appearing focal osseous lesions.        Impression:      Small caliber right chest tube is in place with the tip in the right  apex. No pneumothorax. There are consolidations with bilateral hilar  retraction and bronchiectasis in the lung apices the overall  configuration and appearance suggests progressive massive fibrosis..  This may be sequela from previous infection or occupational exposure or  possibly post radiation/treatment related changes in the appropriate  clinical setting. Groundglass opacities in the lungs more inferiorly  bilaterally may be related to chronic or acute pneumonitis or infection.  There are no prior chest CTs available for comparison.     Electronically Signed By-Miles Reed DO. On:11/1/2020 8:42 PM  This report was finalized on [id number] by  Miles Reed DO..    XR Chest 1 View [[id number]] Collected: 11/01/20 0743     Updated: 11/01/20 0747    Narrative:         DATE OF EXAM:   11/1/2020 6:24 AM     PROCEDURE:   XR CHEST 1 VW-     INDICATIONS:   soa; J93.9-Pneumothorax, unspecified; E87.6-Hypokalemia;  J84.10-Pulmonary fibrosis, unspecified     COMPARISON:  10/31/2020     TECHNIQUE:   Portable chest radiograph.     FINDINGS:    There is a smallbore right chest tube in place. There is a suspected  small residual right apical pneumothorax. Heart size enlarged. No  pneumothorax on the left. There are small bilateral pleural effusions.  There are suspected areas of chronic scarring at the left and right lung  apex. Stable fullness in left paratracheal soft tissues.       Impression:      1. Right smallbore chest tube in place with suspected small residual  right apical pneumothorax.  2. Abnormal appearance of the left and right lung apex which may relate  to chronic scarring and/or consolidation.   3. Left paratracheal soft  tissue fullness again noted and could be  further assessed with cross-sectional imaging.  3. Small bilateral pleural effusions.     Electronically Signed By-Kelton Mousealisha On:11/1/2020 7:45 AM  This report was finalized on [id number] by  Kelton Cooney .    XR Chest 1 View [[id number]] Collected: 11/01/20 0712     Updated: 11/01/20 0715    Narrative:         DATE OF EXAM:   10/31/2020 9:57 PM     PROCEDURE:   XR CHEST 1 VW-     INDICATIONS:   Shortness of air     COMPARISON:  No Comparisons Available     TECHNIQUE:   Portable chest radiograph.     FINDINGS:    There is a moderate right apical pneumothorax measuring 3.8 cm and the  right apex. The heart size is normal. No definite pneumothorax on the  left. There is a suspected chronic biapical scarring/consolidation  noted. Left paratracheal soft tissue fullness. Small bilateral pleural  effusions. Surgical clips overlie the left axilla.       Impression:      1. Moderate right apical pneumothorax measuring 3.8 cm.  2. Bilateral upper lobe consolidation and/or chronic scarring.  3. Fullness in the left paratracheal soft tissues, chest CT may be  helpful to further characterize.  4. Small bilateral pleural effusions.     Electronically Signed By-Kelton Ivet On:11/1/2020 7:13 AM  This report was finalized on [id number] by  Kelton Cooney .    XR Chest 1 View [[id number]] Collected: 11/01/20 0706     Updated: 11/01/20 0711    Narrative:         DATE OF EXAM:   10/31/2020 10:26 PM     PROCEDURE:   XR CHEST 1 VW-     INDICATIONS:   Post chest tube insertion     COMPARISON:  10/31/2020     TECHNIQUE:   Portable chest radiograph.     FINDINGS:    Interval placement of a smallbore chest tube overlying the right apex  with improved aeration of the right lung. There is persistent pleural  fluid and air at the right apex. Residual right apical pneumothorax  measures 18 mm from the right apex, previously 37 mm. There is biapical  consolidation/scarring. No pneumothorax on left.  "Small bilateral pleural  effusions. There is an abnormal appearance of the left paratracheal soft  tissues with deviation of the trachea to the right.       Impression:      1. Placement of a smallbore chest tube overlying the right apex with  decrease in size of right apical pneumothorax now measuring 18 mm at the  right apex, previously 37 mm.  2. Bilateral upper lobe consolidation and/or chronic scarring.  3. Abnormal fullness in the left paratracheal region, consider chest CT  follow-up to exclude mass or adenopathy.  4. Small bilateral pleural effusions.     Electronically Signed By-Kelton Cooney On:11/1/2020 7:09 AM  This report was finalized on [id number] by  Kelton Cooney, .                ASSESSMENT/PLAN:     Pneumothorax on right    Breast cancer of upper-outer quadrant of left female breast (CMS/HCC)    Pulmonary fibrosis (CMS/HCC)    Seizures (CMS/HCC)    Hypokalemia    Elevated glucose  Acute hypoxic respiratory failure, likely multifactorial with pneumothorax and pulmonary fibrosis and exacerbated by pain at chest tube insertion site  Right pneumothorax, recurrence following a biopsy 10/22/2020  Pulmonary fibrosis  Breast cancer  Seizures  Anxiety disorder  Bifrontal glioma     PLAN    O2 per nasal cannula, titrate to maintain a saturation >95 %  Currently on NRB mask.   Chest tube placed with near resolution of pneumothorax.   Leave chest tube to suction. CT Surgery following.   Pain management with as needed Dilaudid.    ST swallow eval. Witnessed a few episodes of cough when drinking liquids.     Thoracic surgery consulted  \"Suspect that the iatrogenic pneumothorax following the bronchoscopy never fully healed.  Patient had slow leak at home and developed a significant pneumothorax.\"  - May need pleurodesis vs VATS per CT surgery.     CT chest:   Small caliber right chest tube is in place with the tip in the right  apex. No pneumothorax. There are consolidations with bilateral hilar  retraction and " bronchiectasis in the lung apices the overall  configuration and appearance suggests progressive massive fibrosis..  This may be sequela from previous infection or occupational exposure or  possibly post radiation/treatment related changes in the appropriate  clinical setting. Groundglass opacities in the lungs more inferiorly  bilaterally may be related to chronic or acute pneumonitis or infection.  There are no prior chest CTs available for comparison.    Worsening Leukocytosis, CT chest with infiltrates. Will obtain Procalcitonin and start broad spectrum ABX. Obtain cultures.     Will obtain autoimmune panel.   COVID 19 negative.   RVP negative.         Patient is currently on chemotherapy with tamoxifen which has been continued per primary    Continue Trileptal and Topamax per primary     High risk patient   Marvin Jorge MD  Pulmonary and CCM     THIS DOCUMENT HAS BEEN ELECTRONICALLY SIGNED BY  Marvin Jorge MD  10:52 EST

## 2020-11-03 NOTE — PLAN OF CARE
Problem: Adult Inpatient Plan of Care  Goal: Plan of Care Review  Flowsheets  Taken 11/3/2020 1034  Outcome Summary: Code 4 called on patient this AM. Now intubated, sedated. Will hold PT until pt more stable. PT did not enter room.

## 2020-11-03 NOTE — DISCHARGE SUMMARY
Date of Death:  11/3/2020  Time of Death:  0922  Cause of Death: Acute Hypoxic Respiratory Failure and Right sided pneumothorax further complicated by co-morbidities of Pulmonary Fibrosis, Breast cancer, seizures       Date of Admission: 10/31/2020  9:40 PM  Social History     Tobacco Use   • Smoking status: Never Smoker   • Smokeless tobacco: Never Used   Substance Use Topics   • Alcohol use: Never     Frequency: Never   • Drug use: Never           Presenting Problem/History of Present Illness  Pulmonary fibrosis (CMS/HCC) [J84.10]  Hypokalemia [E87.6]  Pneumothorax on right [J93.9]      Hospital Course  Some information obtained and copied directly from EMR.  Admit 10/31/20      The patient presented to the emergency department for evaluation of sudden onset of shortness of air and right sided chest pain described as sharp.  There is no report of fever, chills, nausea, vomiting, midsternal chest pain, cough, or expectoration.  Chest x-ray performed in the emergency department revealed a moderate right pneumothorax.  An emergent chest tube was placed in the emergency department and the patient had sudden relief of her symptoms.  She was admitted for further evaluation and treatment.    The patient has had a complicated recent medical history.  On 10/22/2020 she underwent a bronchoscopy with biopsy for confirmation of bilateral pulmonary fibrosis by Dr. Craig at [facility].  She had a post procedure right pneumothorax and had a chest tube placed.  She was then admitted for further treatment.  The chest tube did not resolve the pneumothorax and the second chest tube was placed on 10/25.  The patient had resolution of the pneumothorax and the chest tube discontinued.  The patient was able to be discharged home on 10/29/2020.     Thoracic surgery was consulted and suspected the iatrogenic pneumothorax following a previous bronchoscopy did not fully heal which resulted in a small leak and a new pneumothorax.  Due  to the patients increased shortness of air and oxygen requirement she was transferred to the ICU for continued care on 11/2/20.  This morning the patient was found to be less responsive and bradycardic.  She the progressed to a PEA arrest requiring CPR and intubation.  She achieved ROSC after one round.  Family was called and soon arrived to bedside.  MD was present and discussed her clinical status to family who decided to change her code status to DNR/Comfort Measures.        Procedures Performed:    11/03 0749 INTUBATION    Consults:   Consults     Date and Time Order Name Status Description    11/1/2020 0618 Inpatient Pulmonology Consult Completed     11/1/2020 0032 Surgery (on-call MD unless specified)            Labs:    Lab Results (last 24 hours)     Procedure Component Value Units Date/Time    POC Lactate [[id number]]  (Abnormal) Collected: 11/03/20 0739    Specimen: Blood Updated: 11/03/20 0752     Lactate 13.8 mmol/L      Comment: Serial Number: [id number]:  [id number]       Lactic Acid, Reflex Timer (This will reflex a repeat order 3-3:15 hours after ordered.) [[id number]] Collected: 11/03/20 0739    Specimen: Blood Updated: 11/03/20 0752    POCT Electrolytes +HGB +HCT [[id number]]  (Abnormal) Collected: 11/03/20 0739    Specimen: Blood Updated: 11/03/20 0752     Sodium 139 mmol/L      POC Potassium 6.2 mmol/L      Ionized Calcium 1.13 mmol/L      Comment: Serial Number: [id number]:  [id number]        Glucose 50 mg/dL      Hematocrit 39 %      Hemoglobin 13.3 g/dL     Blood Gas, Arterial - [[id number]]  (Abnormal) Collected: 11/03/20 0739    Specimen: Arterial Blood Updated: 11/03/20 0751     Site Right Radial     Eugene's Test Positive     pH, Arterial 6.862 pH units      pCO2, Arterial 76.3 mm Hg      pO2, Arterial 107.5 mm Hg      HCO3, Arterial 13.7 mmol/L      Base Excess, Arterial -20.8 mmol/L      Comment: Serial Number: [id number]:  [id number]        O2 Saturation, Arterial 91.1 %      CO2 Content 16.0  mmol/L      Barometric Pressure for Blood Gas --     Comment: N/A        Modality Adult Vent     FIO2 100 %      Ventilator Mode ;AC     Set Tidal Volume 400     PEEP 5     Hemodilution No     Respiratory Rate 16    POC Glucose Once [[id number]]  (Abnormal) Collected: 11/03/20 0747    Specimen: Blood Updated: 11/03/20 0749     Glucose 53 mg/dL      Comment: Serial Number: [id number]:  [id number]       POC Glucose Once [[id number]]  (Abnormal) Collected: 11/03/20 0739    Specimen: Blood Updated: 11/03/20 0747     Glucose 50 mg/dL      Comment: Serial Number: [id number]:  [id number]       POC Glucose Once [[id number]]  (Abnormal) Collected: 11/03/20 0359    Specimen: Blood Updated: 11/03/20 0400     Glucose 143 mg/dL      Comment: Serial Number: [id number]:  [id number]        With / DsDNA, RNP, Sjogrens A / B, Ness [[id number]] Collected: 11/02/20 1154    Specimen: Blood Updated: 11/02/20 1300    ANCA Panel [[id number]] Collected: 11/02/20 1215    Specimen: Blood Updated: 11/02/20 1300    Blood Culture - Blood, Hand, Right [[id number]] Collected: 11/02/20 1154    Specimen: Blood from Hand, Right Updated: 11/02/20 1300    Blood Culture - Blood, Arm, Right [[id number]] Collected: 11/02/20 1220    Specimen: Blood from Arm, Right Updated: 11/02/20 1300    Hemoglobin A1c [[id number]]  (Normal) Collected: 11/01/20 0313    Specimen: Blood Updated: 11/02/20 1133     Hemoglobin A1C 5.2 %     Narrative:      Hemoglobin A1C Reference Range:    <5.7 %        Normal  5.7-6.4 %     Increased risk for diabetes  > 6.4 %        Diabetes       These guidelines have been recommended by the American Diabetic Association for Hgb A1c.      The following 2010 guidelines have been recommended by the American Diabetes Association for Hemoglobin A1c.    HBA1c 5.7-6.4% Increased risk for future diabetes (pre-diabetes)  HBA1c     >6.4% Diabetes      Respiratory Panel PCR w/COVID-19(SARS-CoV-2) LEANNE/ROZ/MELVIN/PAD/COR/MAD/ANGELICA In-House, NP Swab  in UTM/VTM, 3-4 HR TAT - Swab, Nasopharynx [[id number]]  (Normal) Collected: 11/02/20 0843    Specimen: Swab from Nasopharynx Updated: 11/02/20 1001     ADENOVIRUS, PCR Not Detected     Coronavirus 229E Not Detected     Coronavirus HKU1 Not Detected     Coronavirus NL63 Not Detected     Coronavirus OC43 Not Detected     COVID19 Not Detected     Human Metapneumovirus Not Detected     Human Rhinovirus/Enterovirus Not Detected     Influenza A PCR Not Detected     Influenza A H1 Not Detected     Influenza A H1 2009 PCR Not Detected     Influenza A H3 Not Detected     Influenza B PCR Not Detected     Parainfluenza Virus 1 Not Detected     Parainfluenza Virus 2 Not Detected     Parainfluenza Virus 3 Not Detected     Parainfluenza Virus 4 Not Detected     RSV, PCR Not Detected     Bordetella pertussis pcr Not Detected     Bordetella parapertussis PCR Not Detected     Chlamydophila pneumoniae PCR Not Detected     Mycoplasma pneumo by PCR Not Detected    Narrative:      Fact sheet for providers: https://docs.[employer]/wp-content/uploads/JTH8591-5306-YL7.1-EUA-Provider-Fact-Sheet-3.pdf    Fact sheet for patients: https://docs.[employer]/wp-content/uploads/MJO2650-0159-LN2.1-EUA-Patient-Fact-Sheet-1.pdf            Imaging and Other Studies:  Imaging Results (Last 72 Hours)     Procedure Component Value Units Date/Time    XR Chest 1 View [[id number]] Collected: 11/03/20 0745     Updated: 11/03/20 0749    Narrative:      DATE OF EXAM:  11/3/2020 7:10 AM     PROCEDURE:  XR CHEST 1 VW-     INDICATIONS:  intubation; J93.9-Pneumothorax, unspecified; E87.6-Hypokalemia;  J84.10-Pulmonary fibrosis, unspecified     COMPARISON:  11/02/2020     TECHNIQUE:   Single radiographic view of the chest was obtained.     FINDINGS:  Patient has been intubated. The endotracheal tube tip terminates about  2.7 cm from the migue. There is a small caliber chest tube on the right  which is kinked and has been pulled back since previous study. There  is  cardiomegaly. There is multifocal rounded parenchymal opacities. There  is increased opacity in the left midlung. No pneumothorax.       Impression:      1.Worse appearance of chest. Endotracheal tube tip has appropriate  position. Increased patchy airspace opacities.  2.The chest tube on the right has been pulled back and only has a small  portion intrapleural.     Electronically Signed By-Yanci Akbar MD On:11/3/2020 7:47 AM  This report was finalized on [id number] by  Yanci Akbar MD.    XR Abdomen KUB [[id number]] Collected: 11/02/20 0955     Updated: 11/02/20 0958    Narrative:      DATE OF EXAM:  11/2/2020 8:35 AM     PROCEDURE:  XR ABDOMEN KUB-     INDICATIONS:  air pocket on CXR; J93.9-Pneumothorax, unspecified; E87.6-Hypokalemia;  J84.10-Pulmonary fibrosis, unspecified     COMPARISON:  Chest radiograph dated 11/2/2020     TECHNIQUE:   Single radiographic view of the abdomen was obtained.     FINDINGS:  The gas below left hemidiaphragm appears within the gas-filled stomach.  There is a nonspecific nonobstructive small bowel gas pattern. There is  only a small amount of gas within the cecum. There is no significant  colonic stool burden. There is a punctate calcification projecting over  the lower pole of the right kidney and possible stone within the midpole  of the left kidney.       Impression:      1. Gas below the left hemidiaphragm appears within the stomach.  2. Nonobstructive small bowel gas pattern.  3. Question small renal stones.     Electronically Signed By-Kanu Ram On:11/2/2020 9:56 AM  This report was finalized on [id number] by  Kanu Ram, .    XR Chest 1 View [[id number]] Collected: 11/02/20 0749     Updated: 11/02/20 0753    Narrative:      EXAMINATION: XR CHEST 1 VW-     DATE OF EXAM: 11/2/2020 5:07 AM     INDICATION: Pneumothorax; J93.9-Pneumothorax, unspecified;  E87.6-Hypokalemia; J84.10-Pulmonary fibrosis, unspecified.     COMPARISON: Chest radiograph dated 11/1/2020,  chest CT dated 11/1/2020     TECHNIQUE: Portable AP view of the chest was obtained.     FINDINGS:  There is a small bore right-sided chest tube in unchanged position.  There is right apical pleural thickening or effusion without visible  pneumothorax. There is similar appearance of the biapical  pleural-parenchymal airspace opacity better evaluated on the recently  performed chest CT from 11/1/2020. There is no pleural effusion. The  cardiomediastinal silhouette appears unchanged.       Impression:      1. Small bore right-sided chest tube in unchanged position with right  apical pleural thickening/effusion.  2. Biapical pleural parenchymal scarring and upper lobe bronchiectasis  better evaluated on the recently performed chest CT from 11/1/2020     Electronically Signed By-Kanu Ram On:11/2/2020 7:51 AM  This report was finalized on [id number] by  Kanu Ram, .    CT Chest Without Contrast [[id number]] Collected: 11/01/20 2027     Updated: 11/01/20 2044    Narrative:         DATE OF EXAM:  11/1/2020 4:35 PM     PROCEDURE:  CT CHEST WO CONTRAST-     INDICATIONS:   Patient with recurrent pneumothorax and apical scarring on plain chest  x-ray.; J93.9-Pneumothorax, unspecified; E87.6-Hypokalemia;  J84.10-Pulmonary fibrosis, unspecified 55-year-old     COMPARISON:   Chest radiograph dated 11/01/2020, and 10/31/2020     TECHNIQUE:  Routine transaxial slices were obtained through the chest without the  administration of intravenous contrast. Reconstructed coronal and  sagittal images were also obtained. Automated exposure control and  iterative construction methods were used.     FINDINGS:  A small caliber right chest tube in place. There is no pneumothorax.  Biapical opacities and interstitial thickening with hilar retraction and  bronchiectasis have the appearance of chronic fibrotic changes which can  be seen with progressive massive fibrosis. There are also scattered  nonspecific groundglass opacities  throughout the lungs right greater  than left. No suspicious pulmonary nodules. No pleural or pericardial  effusions. The left paratracheal fullness on the chest radiograph part  of the chronic appearing consolidations and fibrosis. No discrete  pathologically enlarged lymph nodes are identified the lack of IV  contrast does lower sensitivity for detecting this. There is mild  ectasia of the descending thoracic aorta. Limited images of the upper  abdomen demonstrate normal adrenal glands. No acute bony abnormality or  aggressive appearing focal osseous lesions.        Impression:      Small caliber right chest tube is in place with the tip in the right  apex. No pneumothorax. There are consolidations with bilateral hilar  retraction and bronchiectasis in the lung apices the overall  configuration and appearance suggests progressive massive fibrosis..  This may be sequela from previous infection or occupational exposure or  possibly post radiation/treatment related changes in the appropriate  clinical setting. Groundglass opacities in the lungs more inferiorly  bilaterally may be related to chronic or acute pneumonitis or infection.  There are no prior chest CTs available for comparison.     Electronically Signed By-Mlies Reed DO. On:11/1/2020 8:42 PM  This report was finalized on [id number] by  Miles Reed DO..    XR Chest 1 View [[id number]] Collected: 11/01/20 0743     Updated: 11/01/20 0747    Narrative:         DATE OF EXAM:   11/1/2020 6:24 AM     PROCEDURE:   XR CHEST 1 VW-     INDICATIONS:   soa; J93.9-Pneumothorax, unspecified; E87.6-Hypokalemia;  J84.10-Pulmonary fibrosis, unspecified     COMPARISON:  10/31/2020     TECHNIQUE:   Portable chest radiograph.     FINDINGS:    There is a smallbore right chest tube in place. There is a suspected  small residual right apical pneumothorax. Heart size enlarged. No  pneumothorax on the left. There are small bilateral pleural effusions.  There are suspected  areas of chronic scarring at the left and right lung  apex. Stable fullness in left paratracheal soft tissues.       Impression:      1. Right smallbore chest tube in place with suspected small residual  right apical pneumothorax.  2. Abnormal appearance of the left and right lung apex which may relate  to chronic scarring and/or consolidation.   3. Left paratracheal soft tissue fullness again noted and could be  further assessed with cross-sectional imaging.  3. Small bilateral pleural effusions.     Electronically Signed By-Kelton Cooney On:11/1/2020 7:45 AM  This report was finalized on [id number] by  Kole Khan    XR Chest 1 View [[id number]] Collected: 11/01/20 0712     Updated: 11/01/20 0715    Narrative:         DATE OF EXAM:   10/31/2020 9:57 PM     PROCEDURE:   XR CHEST 1 VW-     INDICATIONS:   Shortness of air     COMPARISON:  No Comparisons Available     TECHNIQUE:   Portable chest radiograph.     FINDINGS:    There is a moderate right apical pneumothorax measuring 3.8 cm and the  right apex. The heart size is normal. No definite pneumothorax on the  left. There is a suspected chronic biapical scarring/consolidation  noted. Left paratracheal soft tissue fullness. Small bilateral pleural  effusions. Surgical clips overlie the left axilla.       Impression:      1. Moderate right apical pneumothorax measuring 3.8 cm.  2. Bilateral upper lobe consolidation and/or chronic scarring.  3. Fullness in the left paratracheal soft tissues, chest CT may be  helpful to further characterize.  4. Small bilateral pleural effusions.     Electronically Signed By-Kelton Cooney On:11/1/2020 7:13 AM  This report was finalized on [id number] by  Kole Khan    XR Chest 1 View [[id number]] Collected: 11/01/20 0706     Updated: 11/01/20 0711    Narrative:         DATE OF EXAM:   10/31/2020 10:26 PM     PROCEDURE:   XR CHEST 1 VW-     INDICATIONS:   Post chest tube insertion     COMPARISON:  10/31/2020     TECHNIQUE:    Portable chest radiograph.     FINDINGS:    Interval placement of a smallbore chest tube overlying the right apex  with improved aeration of the right lung. There is persistent pleural  fluid and air at the right apex. Residual right apical pneumothorax  measures 18 mm from the right apex, previously 37 mm. There is biapical  consolidation/scarring. No pneumothorax on left. Small bilateral pleural  effusions. There is an abnormal appearance of the left paratracheal soft  tissues with deviation of the trachea to the right.       Impression:      1. Placement of a smallbore chest tube overlying the right apex with  decrease in size of right apical pneumothorax now measuring 18 mm at the  right apex, previously 37 mm.  2. Bilateral upper lobe consolidation and/or chronic scarring.  3. Abnormal fullness in the left paratracheal region, consider chest CT  follow-up to exclude mass or adenopathy.  4. Small bilateral pleural effusions.     Electronically Signed By-Kelton Cooney On:11/1/2020 7:09 AM  This report was finalized on [id number] by  Kelton Cooney, .              Note scribed by me for Dr. Her

## 2020-11-03 NOTE — NURSING NOTE
Pt asystole on cardiac monitor. Breath sounds and hearts tones absent and verified by a 2nd RN. Pt's family at bedside.

## 2020-11-03 NOTE — PROCEDURES
Date:   11/3/20  Procedure:  Intubation  Indication:  Acute hypoxic respiratory failure/CODE 4   Performed by: Cristy QUIGLEY  Attending:  Dr. Her    The patient was in PEA and a code 4 called.  The patient was placed in a flat position.  No sedation was needed.  The patient was easily ventilated using an ambu bag. The MAC 4 BLADE was used and inserted into the oropharynx at which time there was a Grade 1 view of the vocal cords. A 7.5-Syrian endotracheal tube was inserted and visualized going through the vocal cords. The stylette was removed. Colorimetric change was visualized on the CO2 meter. Breath sounds were heard in both lung fields equally. The endotracheal tube was placed at 21 cm, measured at the lip.      A chest x-ray was ordered to assess for pneumothorax and verify endotrachealtube placement.   Estimated Blood Loss:  0  The patient tolerated the procedure well and there were no complications.

## 2020-11-03 NOTE — SIGNIFICANT NOTE
Pt now intubated following Code 4 this morning. Pt not appropriate for ST services at this time. Will continue to follow once medically stable/appropriate.

## 2020-11-03 NOTE — NURSING NOTE
Spoke with [employer] representative, Kelsi Castillo. Patients family denied [facility] services. Patient released to  home and Mohan with [facility] was contacted.     [facility] id number [id number]

## 2020-11-03 NOTE — PROGRESS NOTES
KPA/PULM/CC PROGRESS NOTE       SUBJECTIVE    The patient presented to the emergency department last night for evaluation sudden onset of shortness of air and right sided chest pain which was sharp.  On arrival to the emergency department the patient was very short of air and had conversational dyspnea.  There is no report of fever, chills, nausea, vomiting, midsternal chest pain, cough, or expectoration.  Chest x-ray performed in the emergency department revealed a moderate right pneumothorax.  An emergent chest tube was placed in the emergency department and the patient had sudden relief of her shortness of air.  She was admitted for further evaluation and treatment     The patient has had a complicated recent medical history.  On 10/22/2020 she underwent a bronchoscopy with biopsy for confirmation of bilateral pulmonary fibrosis by Dr. Craig at [facility].  She had a post procedure right pneumothorax and had a chest tube placed.  She was then admitted for further treatment.  The chest tube did not resolve the pneumothorax and the second chest tube was placed on 10/25.  The patient had resolution of the pneumothorax and the chest tube discontinued.  The patient was able to be discharged home on 10/29/2020.    11/2/20 Increased FiO2 requirements. C/o SOA   11/3: Events reviewed.  Status post code 4 this morning.  Currently intubated unresponsive to painful stimuli.  Remains on ventilator support.       REVIEW OF SYSTEMS    Unable to obtain  OBJECTIVE    Vitals:    11/03/20 0505 11/03/20 0625 11/03/20 0730 11/03/20 0800   BP: 97/51 150/89 (!) 150/36 (!) 88/56   Pulse: (!) 124 (!) 122 104 (!) 129   Resp:       Temp:       TempSrc:       SpO2: 91% 90% (!) 69% 98%   Weight:       Height:          Intake/Output last 3 shifts:  I/O last 3 completed shifts:  In: 1440 [P.O.:100; I.V.:1340]  Out: 660 [Urine:550; Chest Tube:110]  Intake/Output this shift:  No intake/output data recorded.    Intake/Output Summary (Last  24 hours) at 11/3/2020 1602  Last data filed at 11/3/2020 0600  Gross per 24 hour   Intake 1440 ml   Output 210 ml   Net 1230 ml          Constitutional:  no acute distress, ill-appearing  Eyes:  conjunctiva normal   HENT:  Atraumatic, external ears normal, nose normal, orally intubated  Neck- normal range of motion, no tenderness, supple   Respiratory:   Good air entry bilaterally, bilateral rhonchi Right chest tube secured  Cardiovascular: Sinus tachycardia, no murmurs, no gallops, no rubs   GI:  Soft, nondistended, normal bowel sounds, nontender, no rebound, no guarding   : Deferred  Musculoskeletal:  No edema, no tenderness, no deformities  Integument:  Well hydrated, no rash, chest tube dressing  Neurologic:   Does not localize to painful stimuli  Psychiatric:   As above       Scheduled Meds:    Continuous Infusions:No current facility-administered medications for this encounter.       PRN Meds:     LABS    CBC  Results from last 7 days   Lab Units 11/03/20  0739 11/02/20  0238 11/01/20  0313 10/31/20  2215   WBC 10*3/mm3  --  14.30* 13.70* 11.40*   RBC 10*6/mm3  --  3.29* 4.08 3.73*   HEMOGLOBIN g/dL  --  10.2* 12.8 12.0   HEMOGLOBIN, POC g/dL 13.3  --   --   --    HEMATOCRIT %  --  31.3* 39.2 35.6   HEMATOCRIT POC % 39  --   --   --    MCV fL  --  95.4 96.1 95.4   PLATELETS 10*3/mm3  --  223 280 298       CMP   Results from last 7 days   Lab Units 11/02/20  0238 11/01/20  0313 10/31/20  2215   SODIUM mmol/L 138 143 139   POTASSIUM mmol/L 3.6 3.8 3.1*   CHLORIDE mmol/L 103 106 100   CO2 mmol/L 23.0 24.0 21.0*   BUN mg/dL 10 12 14   CREATININE mg/dL 0.58 0.73 1.01*   GLUCOSE mg/dL 145* 121* 291*   ALBUMIN g/dL  --   --  3.40*   BILIRUBIN mg/dL  --   --  0.2   ALK PHOS U/L  --   --  101   AST (SGOT) U/L  --   --  73*   ALT (SGPT) U/L  --   --  29       TROPONIN        CoAg  Results from last 7 days   Lab Units 10/31/20  7755   INR  1.07   APTT seconds 23.6*       ABG  Results from last 7 days   Lab Units  11/03/20  0739   PH, ARTERIAL pH units 6.862*   PCO2, ARTERIAL mm Hg 76.3*   PO2 ART mm Hg 107.5   O2 SATURATION ART % 91.1*   BASE EXCESS ART mmol/L -20.8*       Microbiology  Microbiology Results (last 10 days)     Procedure Component Value - Date/Time    Blood Culture - Blood, Arm, Right [[id number]] Collected: 11/02/20 1220    Lab Status: Preliminary result Specimen: Blood from Arm, Right Updated: 11/03/20 1300     Blood Culture No growth at 24 hours    Blood Culture - Blood, Hand, Right [[id number]] Collected: 11/02/20 1154    Lab Status: Preliminary result Specimen: Blood from Hand, Right Updated: 11/03/20 1300     Blood Culture No growth at 24 hours    Respiratory Panel PCR w/COVID-19(SARS-CoV-2) LEANNE/ROZ/MELVIN/PAD/COR/MAD/ANGELICA In-House, NP Swab in UTM/VTM, 3-4 HR TAT - Swab, Nasopharynx [[id number]]  (Normal) Collected: 11/02/20 0843    Lab Status: Final result Specimen: Swab from Nasopharynx Updated: 11/02/20 1001     ADENOVIRUS, PCR Not Detected     Coronavirus 229E Not Detected     Coronavirus HKU1 Not Detected     Coronavirus NL63 Not Detected     Coronavirus OC43 Not Detected     COVID19 Not Detected     Human Metapneumovirus Not Detected     Human Rhinovirus/Enterovirus Not Detected     Influenza A PCR Not Detected     Influenza A H1 Not Detected     Influenza A H1 2009 PCR Not Detected     Influenza A H3 Not Detected     Influenza B PCR Not Detected     Parainfluenza Virus 1 Not Detected     Parainfluenza Virus 2 Not Detected     Parainfluenza Virus 3 Not Detected     Parainfluenza Virus 4 Not Detected     RSV, PCR Not Detected     Bordetella pertussis pcr Not Detected     Bordetella parapertussis PCR Not Detected     Chlamydophila pneumoniae PCR Not Detected     Mycoplasma pneumo by PCR Not Detected    Narrative:      Fact sheet for providers: https://docs.[employer]/wp-content/uploads/LOP2486-7438-JA4.1-EUA-Provider-Fact-Sheet-3.pdf    Fact sheet for patients:  https://docs.[employer]/wp-content/uploads/BZF7981-3887-WY6.1-EUA-Patient-Fact-Sheet-1.pdf          IMAGING & OTHER STUDIES    Imaging Results (Last 72 Hours)     Procedure Component Value Units Date/Time    XR Chest 1 View [[id number]] Collected: 11/03/20 0745     Updated: 11/03/20 0749    Narrative:      DATE OF EXAM:  11/3/2020 7:10 AM     PROCEDURE:  XR CHEST 1 VW-     INDICATIONS:  intubation; J93.9-Pneumothorax, unspecified; E87.6-Hypokalemia;  J84.10-Pulmonary fibrosis, unspecified     COMPARISON:  11/02/2020     TECHNIQUE:   Single radiographic view of the chest was obtained.     FINDINGS:  Patient has been intubated. The endotracheal tube tip terminates about  2.7 cm from the migue. There is a small caliber chest tube on the right  which is kinked and has been pulled back since previous study. There is  cardiomegaly. There is multifocal rounded parenchymal opacities. There  is increased opacity in the left midlung. No pneumothorax.       Impression:      1.Worse appearance of chest. Endotracheal tube tip has appropriate  position. Increased patchy airspace opacities.  2.The chest tube on the right has been pulled back and only has a small  portion intrapleural.     Electronically Signed By-Yanci Akbar MD On:11/3/2020 7:47 AM  This report was finalized on [id number] by  Yanci Akbar MD.    XR Abdomen KUB [[id number]] Collected: 11/02/20 0955     Updated: 11/02/20 0958    Narrative:      DATE OF EXAM:  11/2/2020 8:35 AM     PROCEDURE:  XR ABDOMEN KUB-     INDICATIONS:  air pocket on CXR; J93.9-Pneumothorax, unspecified; E87.6-Hypokalemia;  J84.10-Pulmonary fibrosis, unspecified     COMPARISON:  Chest radiograph dated 11/2/2020     TECHNIQUE:   Single radiographic view of the abdomen was obtained.     FINDINGS:  The gas below left hemidiaphragm appears within the gas-filled stomach.  There is a nonspecific nonobstructive small bowel gas pattern. There is  only a small amount of gas within the cecum. There  is no significant  colonic stool burden. There is a punctate calcification projecting over  the lower pole of the right kidney and possible stone within the midpole  of the left kidney.       Impression:      1. Gas below the left hemidiaphragm appears within the stomach.  2. Nonobstructive small bowel gas pattern.  3. Question small renal stones.     Electronically Signed ByKyle Ram On:11/2/2020 9:56 AM  This report was finalized on [id number] by  Kole Chairez    XR Chest 1 View [[id number]] Collected: 11/02/20 0749     Updated: 11/02/20 0753    Narrative:      EXAMINATION: XR CHEST 1 VW-     DATE OF EXAM: 11/2/2020 5:07 AM     INDICATION: Pneumothorax; J93.9-Pneumothorax, unspecified;  E87.6-Hypokalemia; J84.10-Pulmonary fibrosis, unspecified.     COMPARISON: Chest radiograph dated 11/1/2020, chest CT dated 11/1/2020     TECHNIQUE: Portable AP view of the chest was obtained.     FINDINGS:  There is a small bore right-sided chest tube in unchanged position.  There is right apical pleural thickening or effusion without visible  pneumothorax. There is similar appearance of the biapical  pleural-parenchymal airspace opacity better evaluated on the recently  performed chest CT from 11/1/2020. There is no pleural effusion. The  cardiomediastinal silhouette appears unchanged.       Impression:      1. Small bore right-sided chest tube in unchanged position with right  apical pleural thickening/effusion.  2. Biapical pleural parenchymal scarring and upper lobe bronchiectasis  better evaluated on the recently performed chest CT from 11/1/2020     Electronically Signed ByKyle Ram On:11/2/2020 7:51 AM  This report was finalized on [id number] by  Kole Chairez    CT Chest Without Contrast [[id number]] Collected: 11/01/20 2027     Updated: 11/01/20 2044    Narrative:         DATE OF EXAM:  11/1/2020 4:35 PM     PROCEDURE:  CT CHEST WO CONTRAST-     INDICATIONS:   Patient with recurrent  pneumothorax and apical scarring on plain chest  x-ray.; J93.9-Pneumothorax, unspecified; E87.6-Hypokalemia;  J84.10-Pulmonary fibrosis, unspecified 55-year-old     COMPARISON:   Chest radiograph dated 11/01/2020, and 10/31/2020     TECHNIQUE:  Routine transaxial slices were obtained through the chest without the  administration of intravenous contrast. Reconstructed coronal and  sagittal images were also obtained. Automated exposure control and  iterative construction methods were used.     FINDINGS:  A small caliber right chest tube in place. There is no pneumothorax.  Biapical opacities and interstitial thickening with hilar retraction and  bronchiectasis have the appearance of chronic fibrotic changes which can  be seen with progressive massive fibrosis. There are also scattered  nonspecific groundglass opacities throughout the lungs right greater  than left. No suspicious pulmonary nodules. No pleural or pericardial  effusions. The left paratracheal fullness on the chest radiograph part  of the chronic appearing consolidations and fibrosis. No discrete  pathologically enlarged lymph nodes are identified the lack of IV  contrast does lower sensitivity for detecting this. There is mild  ectasia of the descending thoracic aorta. Limited images of the upper  abdomen demonstrate normal adrenal glands. No acute bony abnormality or  aggressive appearing focal osseous lesions.        Impression:      Small caliber right chest tube is in place with the tip in the right  apex. No pneumothorax. There are consolidations with bilateral hilar  retraction and bronchiectasis in the lung apices the overall  configuration and appearance suggests progressive massive fibrosis..  This may be sequela from previous infection or occupational exposure or  possibly post radiation/treatment related changes in the appropriate  clinical setting. Groundglass opacities in the lungs more inferiorly  bilaterally may be related to chronic or  acute pneumonitis or infection.  There are no prior chest CTs available for comparison.     Electronically Signed By-Miles Reed DO. On:11/1/2020 8:42 PM  This report was finalized on [id number] by  Miles Reed DO..    XR Chest 1 View [[id number]] Collected: 11/01/20 0743     Updated: 11/01/20 0747    Narrative:         DATE OF EXAM:   11/1/2020 6:24 AM     PROCEDURE:   XR CHEST 1 VW-     INDICATIONS:   soa; J93.9-Pneumothorax, unspecified; E87.6-Hypokalemia;  J84.10-Pulmonary fibrosis, unspecified     COMPARISON:  10/31/2020     TECHNIQUE:   Portable chest radiograph.     FINDINGS:    There is a smallbore right chest tube in place. There is a suspected  small residual right apical pneumothorax. Heart size enlarged. No  pneumothorax on the left. There are small bilateral pleural effusions.  There are suspected areas of chronic scarring at the left and right lung  apex. Stable fullness in left paratracheal soft tissues.       Impression:      1. Right smallbore chest tube in place with suspected small residual  right apical pneumothorax.  2. Abnormal appearance of the left and right lung apex which may relate  to chronic scarring and/or consolidation.   3. Left paratracheal soft tissue fullness again noted and could be  further assessed with cross-sectional imaging.  3. Small bilateral pleural effusions.     Electronically Signed By-Kelton Cooney On:11/1/2020 7:45 AM  This report was finalized on [id number] by  Kelton Cooney, .    XR Chest 1 View [[id number]] Collected: 11/01/20 0712     Updated: 11/01/20 0715    Narrative:         DATE OF EXAM:   10/31/2020 9:57 PM     PROCEDURE:   XR CHEST 1 VW-     INDICATIONS:   Shortness of air     COMPARISON:  No Comparisons Available     TECHNIQUE:   Portable chest radiograph.     FINDINGS:    There is a moderate right apical pneumothorax measuring 3.8 cm and the  right apex. The heart size is normal. No definite pneumothorax on the  left. There is a suspected chronic  biapical scarring/consolidation  noted. Left paratracheal soft tissue fullness. Small bilateral pleural  effusions. Surgical clips overlie the left axilla.       Impression:      1. Moderate right apical pneumothorax measuring 3.8 cm.  2. Bilateral upper lobe consolidation and/or chronic scarring.  3. Fullness in the left paratracheal soft tissues, chest CT may be  helpful to further characterize.  4. Small bilateral pleural effusions.     Electronically Signed By-Kelton Cooney On:11/1/2020 7:13 AM  This report was finalized on [id number] by  Kelton Cooney .    XR Chest 1 View [[id number]] Collected: 11/01/20 0706     Updated: 11/01/20 0711    Narrative:         DATE OF EXAM:   10/31/2020 10:26 PM     PROCEDURE:   XR CHEST 1 VW-     INDICATIONS:   Post chest tube insertion     COMPARISON:  10/31/2020     TECHNIQUE:   Portable chest radiograph.     FINDINGS:    Interval placement of a smallbore chest tube overlying the right apex  with improved aeration of the right lung. There is persistent pleural  fluid and air at the right apex. Residual right apical pneumothorax  measures 18 mm from the right apex, previously 37 mm. There is biapical  consolidation/scarring. No pneumothorax on left. Small bilateral pleural  effusions. There is an abnormal appearance of the left paratracheal soft  tissues with deviation of the trachea to the right.       Impression:      1. Placement of a smallbore chest tube overlying the right apex with  decrease in size of right apical pneumothorax now measuring 18 mm at the  right apex, previously 37 mm.  2. Bilateral upper lobe consolidation and/or chronic scarring.  3. Abnormal fullness in the left paratracheal region, consider chest CT  follow-up to exclude mass or adenopathy.  4. Small bilateral pleural effusions.     Electronically Signed By-Kelton Ivet On:11/1/2020 7:09 AM  This report was finalized on [id number] by  Kelton Cooney .                ASSESSMENT/PLAN:     Pneumothorax on  right    Breast cancer of upper-outer quadrant of left female breast (CMS/HCC)    Pulmonary fibrosis (CMS/HCC)    Seizures (CMS/HCC)    Hypokalemia    Elevated glucose  Acute hypoxic respiratory failure, likely multifactorial with pneumothorax and pulmonary fibrosis and exacerbated by pain at chest tube insertion site  Right pneumothorax, recurrence following a biopsy 10/22/2020  Pulmonary fibrosis  Breast cancer  Seizures  Anxiety disorder  Bifrontal glioma  Cardiopulmonary arrest  Acute encephalopathy concern for anoxic encephalopathy     PLAN    Patient is currently intubated.  She is unresponsive to painful stimuli.  Continue with ventilator support.  ABG, chest x-ray and ventilator settings reviewed.  I had a lengthy discussion with patient's POA and multiple other family members.  I discussed the differential diagnosis of the events that could lead to her cardiac arrest.  We also discussed the concern for anoxic encephalopathy.  I recommended that patient undergoes a CT scan of the chest PE protocol and a CT scan of the head.  Therapeutic hypothermia was also recommended.  Patient's brother after discussing with multiple other family members says that based on patient's previous wishes, he did not want her to be on the ventilator.  We discussed goals of care in detail.  Based on patient's previous wishes family requested that patient be made DNR with plans for possible termination of life support later on when rest of the family members had a chance to visit her.  While on the ventilator patient started dropping her heart rate and blood pressure.  She was DNR.  She subsequently .  Family declined autopsy.  Greater than 60 minutes of critical care time spent on the care of the patient.   THIS DOCUMENT HAS BEEN ELECTRONICALLY SIGNED BY  Imani Her MD

## 2020-11-04 LAB
C-ANCA TITR SER IF: NORMAL TITER
MYELOPEROXIDASE AB SER IA-ACNC: <9 U/ML (ref 0–9)
P-ANCA ATYPICAL TITR SER IF: NORMAL TITER
P-ANCA TITR SER IF: NORMAL TITER
PROTEINASE3 AB SER IA-ACNC: <3.5 U/ML (ref 0–3.5)

## 2020-11-04 NOTE — PROGRESS NOTES
Case Management Discharge Note      Final Note:          Selected Continued Care - Discharged on 11/3/2020 Admission date: 10/31/2020 - Discharge disposition:               Final Discharge Disposition Code: 41 -  in medical facility

## 2020-11-07 LAB
BACTERIA SPEC AEROBE CULT: NORMAL
BACTERIA SPEC AEROBE CULT: NORMAL

## 2024-03-06 NOTE — SIGNIFICANT NOTE
Patient was found to be bradycardic this morning with a heart rate in the 40's and then progressed to PEA.  CODE 4 was called and ACLS protocol followed.  She achieved ROSC after one round of CPR.  Please see code sheet for details.  She was intubated during.     billy